# Patient Record
Sex: FEMALE | Race: BLACK OR AFRICAN AMERICAN | Employment: FULL TIME | URBAN - METROPOLITAN AREA
[De-identification: names, ages, dates, MRNs, and addresses within clinical notes are randomized per-mention and may not be internally consistent; named-entity substitution may affect disease eponyms.]

---

## 2017-09-28 ENCOUNTER — OFFICE VISIT (OUTPATIENT)
Dept: INTERNAL MEDICINE CLINIC | Age: 32
End: 2017-09-28
Payer: MEDICARE

## 2017-09-28 ENCOUNTER — TELEPHONE (OUTPATIENT)
Dept: INTERNAL MEDICINE CLINIC | Age: 32
End: 2017-09-28

## 2017-09-28 VITALS
OXYGEN SATURATION: 98 % | HEIGHT: 67 IN | DIASTOLIC BLOOD PRESSURE: 70 MMHG | WEIGHT: 230 LBS | RESPIRATION RATE: 21 BRPM | HEART RATE: 85 BPM | SYSTOLIC BLOOD PRESSURE: 120 MMHG | BODY MASS INDEX: 36.1 KG/M2

## 2017-09-28 DIAGNOSIS — J31.0 CHRONIC RHINITIS: Primary | ICD-10-CM

## 2017-09-28 DIAGNOSIS — F31.9 BIPOLAR 1 DISORDER (HCC): ICD-10-CM

## 2017-09-28 DIAGNOSIS — L21.9 SEBORRHEIC DERMATITIS: ICD-10-CM

## 2017-09-28 DIAGNOSIS — E66.9 OBESITY (BMI 35.0-39.9 WITHOUT COMORBIDITY): ICD-10-CM

## 2017-09-28 DIAGNOSIS — T78.40XD ALLERGY, SUBSEQUENT ENCOUNTER: ICD-10-CM

## 2017-09-28 PROCEDURE — 99213 OFFICE O/P EST LOW 20 MIN: CPT | Performed by: FAMILY MEDICINE

## 2017-09-28 RX ORDER — CITALOPRAM 20 MG/1
20 TABLET ORAL DAILY
Qty: 30 TABLET | Refills: 1 | Status: SHIPPED | OUTPATIENT
Start: 2017-09-28 | End: 2018-01-03 | Stop reason: SDUPTHER

## 2017-09-28 RX ORDER — RISPERIDONE 0.25 MG/1
0.25 TABLET, FILM COATED ORAL DAILY
Qty: 30 TABLET | Refills: 1 | Status: SHIPPED | OUTPATIENT
Start: 2017-09-28 | End: 2018-01-03 | Stop reason: SDUPTHER

## 2017-09-28 RX ORDER — LORATADINE 10 MG/1
10 TABLET ORAL DAILY
Qty: 30 TABLET | Refills: 0 | Status: SHIPPED | OUTPATIENT
Start: 2017-09-28 | End: 2018-08-30 | Stop reason: ALTCHOICE

## 2017-09-28 ASSESSMENT — PATIENT HEALTH QUESTIONNAIRE - PHQ9
SUM OF ALL RESPONSES TO PHQ9 QUESTIONS 1 & 2: 0
1. LITTLE INTEREST OR PLEASURE IN DOING THINGS: 0
SUM OF ALL RESPONSES TO PHQ QUESTIONS 1-9: 0
2. FEELING DOWN, DEPRESSED OR HOPELESS: 0

## 2017-09-28 ASSESSMENT — ENCOUNTER SYMPTOMS
GASTROINTESTINAL NEGATIVE: 1
EYES NEGATIVE: 1
RESPIRATORY NEGATIVE: 1
ALLERGIC/IMMUNOLOGIC NEGATIVE: 1

## 2017-10-03 NOTE — TELEPHONE ENCOUNTER
Patient notified she will call back if she needs a referral to psychiatry.  Or any further assistance

## 2017-10-03 NOTE — TELEPHONE ENCOUNTER
Patient needs this letter for her DX of bi polar disorder to her [employer]? Are we treating patient for the Bi polar?

## 2017-10-03 NOTE — TELEPHONE ENCOUNTER
Per verbal orders of Dr Sj Villalobos not to provide the letter to patient since he did not diagnosis patient he wants patient to go establish with mental health for evaluation and the mental health can provide letter. Per patient dr Paula De La Cruz diagnosed patient so she is asking for Dr Layne Givens to provide letter.  Message given to jens

## 2017-10-03 NOTE — TELEPHONE ENCOUNTER
Dr. Perfecto Nugent has an excellent idea. I have not seen patient in 2 years.     She must get any certification she needs from psychiatry

## 2017-10-04 ENCOUNTER — OFFICE VISIT (OUTPATIENT)
Dept: DERMATOLOGY | Age: 32
End: 2017-10-04
Payer: MEDICARE

## 2017-10-04 VITALS
SYSTOLIC BLOOD PRESSURE: 115 MMHG | WEIGHT: 230 LBS | HEIGHT: 67 IN | BODY MASS INDEX: 36.1 KG/M2 | OXYGEN SATURATION: 99 % | HEART RATE: 73 BPM | DIASTOLIC BLOOD PRESSURE: 81 MMHG

## 2017-10-04 DIAGNOSIS — B36.0 TINEA VERSICOLOR: ICD-10-CM

## 2017-10-04 DIAGNOSIS — L21.9 SEBORRHEIC DERMATITIS: Primary | ICD-10-CM

## 2017-10-04 PROCEDURE — 99202 OFFICE O/P NEW SF 15 MIN: CPT | Performed by: DERMATOLOGY

## 2017-10-04 RX ORDER — METRONIDAZOLE 500 MG/1
500 TABLET ORAL 2 TIMES DAILY
COMMUNITY
End: 2018-02-01 | Stop reason: ALTCHOICE

## 2017-10-04 RX ORDER — FLUOCINOLONE ACETONIDE 0.1 MG/ML
SOLUTION TOPICAL
Qty: 60 ML | Refills: 2 | Status: SHIPPED | OUTPATIENT
Start: 2017-10-04 | End: 2018-02-01 | Stop reason: ALTCHOICE

## 2017-10-04 RX ORDER — KETOCONAZOLE 20 MG/G
CREAM TOPICAL
Qty: 30 G | Refills: 1 | Status: SHIPPED | OUTPATIENT
Start: 2017-10-04 | End: 2018-02-01 | Stop reason: SDUPTHER

## 2017-10-04 RX ORDER — MEDROXYPROGESTERONE ACETATE 10 MG/1
10 TABLET ORAL DAILY
COMMUNITY
End: 2018-08-30 | Stop reason: ALTCHOICE

## 2017-10-04 NOTE — MR AVS SNAPSHOT
your BMI, the greater your risk of heart disease, high blood pressure, type 2 diabetes, stroke, gallstones, arthritis, sleep apnea, and certain cancers. BMI is not perfect. It may overestimate body fat in athletes and people who are more muscular. Even a small weight loss (between 5 and 10 percent of your current weight) by decreasing your calorie intake and becoming more physically active will help lower your risk of developing or worsening diseases associated with obesity. Learn more at: [employer]          Instructions    1. Use TSal (over the counter) every other day. Apply to scalp and let sit for 5 minutes, rinse off  2. Apply fluocinonide solution to scalp daily. Drop on and massage into scalp  3. Apply ketoconazole behind ears twice a day  4.  Follow up in office in 3 months            Medications and Orders      Your Current Medications Are              medroxyPROGESTERone (PROVERA) 10 MG tablet Take 10 mg by mouth daily    metroNIDAZOLE (FLAGYL) 500 MG tablet Take 500 mg by mouth 2 times daily    risperiDONE (RISPERDAL) 0.25 MG tablet Take 1 tablet by mouth daily    loratadine (CLARITIN) 10 MG tablet Take 1 tablet by mouth daily    citalopram (CELEXA) 20 MG tablet Take 1 tablet by mouth daily    fluticasone (FLONASE) 50 MCG/ACT nasal spray 1 spray by Nasal route daily as needed    azelastine (ASTELIN) 0.1 % nasal spray 2 sprays by Nasal route 2 times daily Use in each nostril as directed    ibuprofen (ADVIL;MOTRIN) 800 MG tablet       Allergies           No Known Allergies         Additional Information        Basic Information     Date Of Birth Sex Race Ethnicity Preferred Language    1985 Female Black Non-/Non  English      Problem List as of 10/4/2017  Date Reviewed: 10/4/2017                Seborrheic dermatitis    Chronic rhinitis    Bipolar 1 disorder (HCC)    Depression    Pes planus of right foot Your Goals as of 10/4/2017 at 3:07 PM                 Weight    Weight (lb) < 200     Notes    Patient Self-Management Goal for Health Maintenance  Goal: I will exercise for 30 minutes 3-5 days per week. Barriers: none  Plan for overcoming my barriers: N/A  Confidence: 6/10  Anticipated Goal Completion Date: 01/2018        Immunizations as of 10/4/2017     Name Date    Influenza Vaccine, unspecified formulation 9/28/2016    Influenza Virus Vaccine 10/21/2015    PPD Test 6/20/2016, 6/8/2016    Tdap (Boostrix, Adacel) 10/21/2015      Preventive Care        Date Due    Yearly Flu Vaccine (1) 10/16/2018 (Originally 9/1/2017)    HIV screening is recommended for all people regardless of risk factors  aged 15-65 years at least once (lifetime) who have never been HIV tested. 10/16/2018 (Originally 2/26/1999)    Pap Smear 10/17/2018 (Originally 10/21/2016)    Tetanus Combination Vaccine (2 - Td) 10/21/2025            [email or web address] Signup           Our records indicate that you have an active [employer] account. You can view your After Visit Summary by going to [email or web address] and logging in with your [employer] username and password. If you don't have a [employer] username and password but a parent or guardian has access to your record, the parent or guardian should login with their own [employer] username and password and access your record to view the After Visit Summary. Additional Information  If you have questions, please contact the physician practice where you receive care. Remember, [employer] is NOT to be used for urgent needs. For medical emergencies, dial 911. For questions regarding your [employer] account call [phone]. If you have a clinical question, please call your doctor's office.

## 2017-10-17 ENCOUNTER — TELEPHONE (OUTPATIENT)
Dept: INTERNAL MEDICINE CLINIC | Age: 32
End: 2017-10-17

## 2017-10-24 RX ORDER — NITROFURANTOIN 25; 75 MG/1; MG/1
100 CAPSULE ORAL 2 TIMES DAILY
Qty: 10 CAPSULE | Refills: 0 | Status: SHIPPED | OUTPATIENT
Start: 2017-10-24 | End: 2017-10-29

## 2017-10-24 NOTE — TELEPHONE ENCOUNTER
Patient states she had a drug screen for work today and showed positive for uti  Starting to have symptoms and asking if she can get meds

## 2018-01-03 ENCOUNTER — TELEPHONE (OUTPATIENT)
Dept: DERMATOLOGY | Age: 33
End: 2018-01-03

## 2018-01-03 RX ORDER — CITALOPRAM 20 MG/1
20 TABLET ORAL DAILY
Qty: 30 TABLET | Refills: 3 | Status: SHIPPED | OUTPATIENT
Start: 2018-01-03 | End: 2018-02-01 | Stop reason: ALTCHOICE

## 2018-01-03 RX ORDER — RISPERIDONE 0.25 MG/1
0.25 TABLET, FILM COATED ORAL DAILY
Qty: 30 TABLET | Refills: 3 | Status: SHIPPED | OUTPATIENT
Start: 2018-01-03 | End: 2018-02-01 | Stop reason: ALTCHOICE

## 2018-02-01 ENCOUNTER — OFFICE VISIT (OUTPATIENT)
Dept: DERMATOLOGY | Age: 33
End: 2018-02-01
Payer: MEDICARE

## 2018-02-01 VITALS
HEART RATE: 61 BPM | HEIGHT: 67 IN | WEIGHT: 219.6 LBS | BODY MASS INDEX: 34.47 KG/M2 | SYSTOLIC BLOOD PRESSURE: 123 MMHG | OXYGEN SATURATION: 100 % | DIASTOLIC BLOOD PRESSURE: 85 MMHG

## 2018-02-01 DIAGNOSIS — L21.9 SEBORRHEIC DERMATITIS: Primary | ICD-10-CM

## 2018-02-01 DIAGNOSIS — B36.0 TINEA VERSICOLOR: ICD-10-CM

## 2018-02-01 PROCEDURE — G8484 FLU IMMUNIZE NO ADMIN: HCPCS | Performed by: DERMATOLOGY

## 2018-02-01 PROCEDURE — 1036F TOBACCO NON-USER: CPT | Performed by: DERMATOLOGY

## 2018-02-01 PROCEDURE — G8427 DOCREV CUR MEDS BY ELIG CLIN: HCPCS | Performed by: DERMATOLOGY

## 2018-02-01 PROCEDURE — G8417 CALC BMI ABV UP PARAM F/U: HCPCS | Performed by: DERMATOLOGY

## 2018-02-01 PROCEDURE — 99213 OFFICE O/P EST LOW 20 MIN: CPT | Performed by: DERMATOLOGY

## 2018-02-01 RX ORDER — KETOCONAZOLE 20 MG/G
CREAM TOPICAL
Qty: 30 G | Refills: 1 | Status: SHIPPED | OUTPATIENT
Start: 2018-02-01 | End: 2018-08-30 | Stop reason: ALTCHOICE

## 2018-02-01 RX ORDER — FLUOCINONIDE TOPICAL SOLUTION USP, 0.05% 0.5 MG/ML
SOLUTION TOPICAL
Qty: 60 ML | Refills: 2 | Status: SHIPPED | OUTPATIENT
Start: 2018-02-01 | End: 2018-08-30 | Stop reason: ALTCHOICE

## 2018-08-30 ENCOUNTER — OFFICE VISIT (OUTPATIENT)
Dept: INTERNAL MEDICINE CLINIC | Age: 33
End: 2018-08-30
Payer: MEDICARE

## 2018-08-30 VITALS
HEART RATE: 86 BPM | RESPIRATION RATE: 20 BRPM | BODY MASS INDEX: 34.69 KG/M2 | WEIGHT: 221 LBS | SYSTOLIC BLOOD PRESSURE: 124 MMHG | HEIGHT: 67 IN | DIASTOLIC BLOOD PRESSURE: 86 MMHG

## 2018-08-30 DIAGNOSIS — E28.2 PCO (POLYCYSTIC OVARIES): Primary | ICD-10-CM

## 2018-08-30 DIAGNOSIS — M21.41 PES PLANUS OF RIGHT FOOT: ICD-10-CM

## 2018-08-30 DIAGNOSIS — M54.50 CHRONIC MIDLINE LOW BACK PAIN WITHOUT SCIATICA: ICD-10-CM

## 2018-08-30 DIAGNOSIS — M25.561 CHRONIC PAIN OF BOTH KNEES: ICD-10-CM

## 2018-08-30 DIAGNOSIS — J31.0 CHRONIC RHINITIS: ICD-10-CM

## 2018-08-30 DIAGNOSIS — N92.6 MENSTRUAL ABNORMALITY: ICD-10-CM

## 2018-08-30 DIAGNOSIS — G89.29 CHRONIC MIDLINE LOW BACK PAIN WITHOUT SCIATICA: ICD-10-CM

## 2018-08-30 DIAGNOSIS — F31.9 BIPOLAR 1 DISORDER (HCC): ICD-10-CM

## 2018-08-30 DIAGNOSIS — Z83.3 FAMILY HISTORY OF DIABETES MELLITUS: ICD-10-CM

## 2018-08-30 DIAGNOSIS — M25.562 CHRONIC PAIN OF BOTH KNEES: ICD-10-CM

## 2018-08-30 DIAGNOSIS — G89.29 CHRONIC PAIN OF BOTH KNEES: ICD-10-CM

## 2018-08-30 PROCEDURE — 99214 OFFICE O/P EST MOD 30 MIN: CPT | Performed by: FAMILY MEDICINE

## 2018-08-30 PROCEDURE — G8417 CALC BMI ABV UP PARAM F/U: HCPCS | Performed by: FAMILY MEDICINE

## 2018-08-30 PROCEDURE — 1036F TOBACCO NON-USER: CPT | Performed by: FAMILY MEDICINE

## 2018-08-30 PROCEDURE — G8427 DOCREV CUR MEDS BY ELIG CLIN: HCPCS | Performed by: FAMILY MEDICINE

## 2018-08-30 ASSESSMENT — ENCOUNTER SYMPTOMS
RESPIRATORY NEGATIVE: 1
GASTROINTESTINAL NEGATIVE: 1
BACK PAIN: 1
EYES NEGATIVE: 1
ALLERGIC/IMMUNOLOGIC NEGATIVE: 1

## 2018-08-30 NOTE — PROGRESS NOTES
Subjective:      Patient ID: Katherine Tuttle is a 28 y.o. female. Back Pain   This is a chronic problem. The current episode started more than 1 month ago. The problem occurs intermittently. The problem has been waxing and waning since onset. The pain is present in the lumbar spine. The quality of the pain is described as aching and cramping. The pain does not radiate. The pain is moderate. The pain is the same all the time. The symptoms are aggravated by stress and position. Risk factors include lack of exercise, poor posture and obesity. She has tried nothing for the symptoms. The treatment provided mild relief. Review of Systems   Constitutional: Negative. HENT: Negative. Eyes: Negative. Respiratory: Negative. Cardiovascular: Negative. Gastrointestinal: Negative. Endocrine: Negative. Musculoskeletal: Positive for arthralgias and back pain. Skin: Negative. Allergic/Immunologic: Negative. Neurological: Negative. Hematological: Negative. Psychiatric/Behavioral: The patient is nervous/anxious and is hyperactive. Past family and social history unremarkable. Objective:   Physical Exam   Constitutional: She is oriented to person, place, and time. She appears well-developed and well-nourished. Obesity   HENT:   Head: Normocephalic and atraumatic. Right Ear: External ear normal.   Left Ear: External ear normal.   Mouth/Throat: Oropharynx is clear and moist.   Eyes: Pupils are equal, round, and reactive to light. Conjunctivae and EOM are normal.   Neck: Normal range of motion. Neck supple. Cardiovascular: Normal rate, regular rhythm, normal heart sounds and intact distal pulses. Pulmonary/Chest: Effort normal and breath sounds normal.   Abdominal: Soft. Bowel sounds are normal.   Genitourinary: Vagina normal and uterus normal.   Musculoskeletal: Normal range of motion. Muscular skeletal chronic back pain. Neurologically intact.   Nares no apparent sign of myelopathy  Chronic complain of bilateral anterior knee pain more on the left than the right. Positive patellofemoral grind on the left. No instability symptoms. Neurological: She is alert and oriented to person, place, and time. She has normal reflexes. Skin: Skin is warm and dry. Psychiatric:   Known history of bipolar disorder. Patient continues to be hyperactive with low focus. However she has no desire to reconnect with mental health service. She denies suicidality   Vitals reviewed. Assessment:       Diagnosis Orders   1. PCO (polycystic ovaries)  CBC    Comprehensive Metabolic Panel    Hemoglobin A1C    Lipid Panel    TSH without Reflex   2. Menstrual abnormality     3. Bipolar 1 disorder ([facility] [address]     4. Pes planus of right foot  AliYuri barrios DO, Orthoped[facility] [address]   5. Chronic rhinitis     6. Chronic pain of both knees  Yuri Bennett DO, Orthoped[facility] [address]   7. Chronic midline low back pain without sciatica  Bernardo Honeycutt DO, [facility]    CBC    Comprehensive Metabolic Panel    Hemoglobin A1C    Lipid Panel    TSH without Reflex   8. Family history of diabetes mellitus  CBC    Comprehensive Metabolic Panel    Hemoglobin A1C    Lipid Panel    TSH without Reflex           Plan:      18-year-old overweight -American female is presented with subjective complaint of lumbar back up. Clinical examination is benign. She has rather sedentary lifestyle, poor posturing and no exercising regimen. Risk factor stratification is advised with sparing use of over-the-counter pain meds. Subjective complain of bilateral knee pain more on the left than the right. Possible chondromalacia patella. Patient will be evaluated by orthopedic service. Patient is demonstrated quadricep strengthening program.  I also suggest physical therapy  Morbid obesity.   She would benefit from low-fat high-fiber diet, daily moderate exercise, lifestyle change and weight loss to keep BMI 27. Brad Michelle Positive family history of diabetes mellitus. Risk factor stratification is advised. bilateral flexible pes planus   Patient recently diagnosed with PCO causing menstrual abnormality by her GYN and placed on metformin  Known history of bipolar disorder. Patient continues to be hyperactive. Patient stated that she continuously wants to be on the move from one activity to another activity. She does not express any desire to reconnect with mental health services. She is advised to reconsider  She denies tobacco, excessive alcohol or illicit drug use  Further recommendations to follow labs and evaluation by orthopedic service  Observe and call for any concern  This note is created with a voice recognition program and while intend to generate a document that accurately reflects the content of the visit, no guarantee can be provided that every mistake has been identified and corrected by editing.           Isa Mendez MD

## 2018-08-30 NOTE — PROGRESS NOTES
Visit Information    Have you changed or started any medications since your last visit including any over-the-counter medicines, vitamins, or herbal medicines? no   Are you having any side effects from any of your medications? -  no  Have you stopped taking any of your medications? Is so, why? -  no    Have you seen any other physician or provider since your last visit? Yes - Records Requested  Have you had any other diagnostic tests since your last visit? No  Have you been seen in the emergency room and/or had an admission to a hospital since we last saw you? Yes - Records Obtained  Have you had your routine dental cleaning in the past 6 months? yes     Have you activated your [employer] account? If not, what are your barriers?  Yes     Patient Care Team:  Roney Maher MD as PCP - General (Family Medicine)  Roney Maher MD as PCP - [facility] Attributed Provider    Medical History Review  Past Medical, Family, and Social History reviewed and does not contribute to the patient presenting condition    Health Maintenance   Topic Date Due    A1C test (Diabetic or Prediabetic)  10/21/2016    Flu vaccine (1) 10/16/2018 (Originally 9/1/2018)    HIV screen  10/16/2018 (Originally 2/26/1999)    Cervical cancer screen  10/17/2018 (Originally 10/21/2016)    DTaP/Tdap/Td vaccine (2 - Td) 10/21/2025

## 2018-12-18 ENCOUNTER — OFFICE VISIT (OUTPATIENT)
Dept: DERMATOLOGY | Age: 33
End: 2018-12-18
Payer: MEDICARE

## 2018-12-18 VITALS
SYSTOLIC BLOOD PRESSURE: 122 MMHG | HEIGHT: 67 IN | DIASTOLIC BLOOD PRESSURE: 85 MMHG | BODY MASS INDEX: 35.47 KG/M2 | HEART RATE: 68 BPM | WEIGHT: 226 LBS | OXYGEN SATURATION: 98 % | TEMPERATURE: 98.3 F

## 2018-12-18 DIAGNOSIS — L21.9 SEBORRHEIC DERMATITIS: Primary | ICD-10-CM

## 2018-12-18 PROCEDURE — G8484 FLU IMMUNIZE NO ADMIN: HCPCS | Performed by: DERMATOLOGY

## 2018-12-18 PROCEDURE — 1036F TOBACCO NON-USER: CPT | Performed by: DERMATOLOGY

## 2018-12-18 PROCEDURE — G8427 DOCREV CUR MEDS BY ELIG CLIN: HCPCS | Performed by: DERMATOLOGY

## 2018-12-18 PROCEDURE — 99213 OFFICE O/P EST LOW 20 MIN: CPT | Performed by: DERMATOLOGY

## 2018-12-18 PROCEDURE — G8417 CALC BMI ABV UP PARAM F/U: HCPCS | Performed by: DERMATOLOGY

## 2018-12-18 RX ORDER — FLUOCINOLONE ACETONIDE 0.11 MG/ML
OIL TOPICAL
Qty: 120 ML | Refills: 6 | Status: SHIPPED | OUTPATIENT
Start: 2018-12-18 | End: 2020-07-28

## 2018-12-18 NOTE — PROGRESS NOTES
Dermatology Patient Note  [facility]  [address]  [address] [place] [address]  Dept: [phone]  Dept Fax: [phone]      VISIT DATE: 12/18/2018   REFERRING PROVIDER: No ref. provider found      Kimberlee Ridley is a 28 y.o. female  who presents today in the office for:    Follow-up (Seborrheic Dermatitis)      HISTORY OF PRESENT ILLNESS:  HPI Rash Followup:    Desiree Soliman was seen today for follow-up evaluation of [facility]    Interim Course: Worsening    Areas of Involvement: Scalp    Associated Symptoms: Itching    Exacerbating Factors: none    Current Medications for this Rash:  T/Sal, lidex     Rash Treatment Compliance:  T/Sal helps, lidex not tolerated    Side Effects from Treatments: Irritation    Interim  Evaluation: None                CURRENT MEDICATIONS:   Current Outpatient Prescriptions   Medication Sig Dispense Refill    fluocinolone (DERMA-SMOOTHE) 0.01 % external oil Apply topically daily to scalp 120 mL 6     No current facility-administered medications for this visit. ALLERGIES:   No Known Allergies    SOCIAL HISTORY:  Social History   Substance Use Topics    Smoking status: Never Smoker    Smokeless tobacco: Never Used    Alcohol use No       REVIEW OF SYSTEMS:  Review of Systems   Constitutional: Negative. Skin:Denies any new changing, growing or bleeding lesions or rashes except as described in the HPI     PHYSICAL EXAM:   /85   Pulse 68   Temp 98.3 °F (36.8 °C) (Oral)   Ht 5' 7\" (1.702 m)   Wt 226 lb (102.5 kg)   SpO2 98%   BMI 35.40 kg/m²     General Exam:  General Appearance: No acute distress, Well nourished     Neuro: Alert and oriented to person, place and time  Psych: Normal affect   Lymph Node: Not performed    Cutaneous Exam: Performed as documented in clinic note below. Sun-exposed skin,which includes the head/face, neck, both arms, digits and/or nails was examined.     Pertinent Physical Exam Findings:  Physical

## 2018-12-28 ENCOUNTER — OFFICE VISIT (OUTPATIENT)
Dept: INTERNAL MEDICINE CLINIC | Age: 33
End: 2018-12-28
Payer: MEDICARE

## 2018-12-28 ENCOUNTER — HOSPITAL ENCOUNTER (OUTPATIENT)
Age: 33
Setting detail: SPECIMEN
Discharge: HOME | End: 2018-12-28
Payer: MEDICARE

## 2018-12-28 VITALS
SYSTOLIC BLOOD PRESSURE: 124 MMHG | OXYGEN SATURATION: 97 % | WEIGHT: 227 LBS | DIASTOLIC BLOOD PRESSURE: 80 MMHG | RESPIRATION RATE: 18 BRPM | BODY MASS INDEX: 35.63 KG/M2 | HEART RATE: 67 BPM | HEIGHT: 67 IN

## 2018-12-28 DIAGNOSIS — E28.2 PCO (POLYCYSTIC OVARIES): ICD-10-CM

## 2018-12-28 DIAGNOSIS — E66.9 OBESITY (BMI 30-39.9): ICD-10-CM

## 2018-12-28 DIAGNOSIS — F31.9 BIPOLAR 1 DISORDER (HCC): ICD-10-CM

## 2018-12-28 DIAGNOSIS — L21.9 SEBORRHEIC DERMATITIS: ICD-10-CM

## 2018-12-28 DIAGNOSIS — R73.01 IFG (IMPAIRED FASTING GLUCOSE): ICD-10-CM

## 2018-12-28 DIAGNOSIS — R73.01 IFG (IMPAIRED FASTING GLUCOSE): Primary | ICD-10-CM

## 2018-12-28 DIAGNOSIS — M54.9 MUSCULOSKELETAL BACK PAIN: ICD-10-CM

## 2018-12-28 DIAGNOSIS — J31.0 CHRONIC RHINITIS: ICD-10-CM

## 2018-12-28 LAB
ALBUMIN SERPL-MCNC: 4.1 G/DL (ref 3.5–5.2)
ALBUMIN/GLOBULIN RATIO: 1.1 (ref 1–2.5)
ALP BLD-CCNC: 73 U/L (ref 35–104)
ALT SERPL-CCNC: 25 U/L (ref 5–33)
ANION GAP SERPL CALCULATED.3IONS-SCNC: 12 MMOL/L (ref 9–17)
AST SERPL-CCNC: 30 U/L
BILIRUB SERPL-MCNC: 0.3 MG/DL (ref 0.3–1.2)
BUN BLDV-MCNC: 10 MG/DL (ref 6–20)
BUN/CREAT BLD: ABNORMAL (ref 9–20)
CALCIUM SERPL-MCNC: 9.5 MG/DL (ref 8.6–10.4)
CHLORIDE BLD-SCNC: 102 MMOL/L (ref 98–107)
CHOLESTEROL/HDL RATIO: 4.9
CHOLESTEROL: 200 MG/DL
CO2: 24 MMOL/L (ref 20–31)
CREAT SERPL-MCNC: 0.94 MG/DL (ref 0.5–0.9)
CREATININE URINE: 348.8 MG/DL (ref 28–217)
ESTIMATED AVERAGE GLUCOSE: 105 MG/DL
GFR AFRICAN AMERICAN: >60 ML/MIN
GFR NON-AFRICAN AMERICAN: >60 ML/MIN
GFR SERPL CREATININE-BSD FRML MDRD: ABNORMAL ML/MIN/{1.73_M2}
GFR SERPL CREATININE-BSD FRML MDRD: ABNORMAL ML/MIN/{1.73_M2}
GLUCOSE BLD-MCNC: 86 MG/DL (ref 70–99)
HBA1C MFR BLD: 5.3 % (ref 4–6)
HCT VFR BLD CALC: 41.2 % (ref 36.3–47.1)
HDLC SERPL-MCNC: 41 MG/DL
HEMOGLOBIN: 13.3 G/DL (ref 11.9–15.1)
LDL CHOLESTEROL: 109 MG/DL (ref 0–130)
MCH RBC QN AUTO: 29.4 PG (ref 25.2–33.5)
MCHC RBC AUTO-ENTMCNC: 32.3 G/DL (ref 28.4–34.8)
MCV RBC AUTO: 90.9 FL (ref 82.6–102.9)
MICROALBUMIN/CREAT 24H UR: <12 MG/L
MICROALBUMIN/CREAT UR-RTO: ABNORMAL MCG/MG CREAT
NRBC AUTOMATED: 0 PER 100 WBC
PDW BLD-RTO: 13.3 % (ref 11.8–14.4)
PLATELET # BLD: 262 K/UL (ref 138–453)
PMV BLD AUTO: 12.3 FL (ref 8.1–13.5)
POTASSIUM SERPL-SCNC: 4 MMOL/L (ref 3.7–5.3)
RBC # BLD: 4.53 M/UL (ref 3.95–5.11)
SODIUM BLD-SCNC: 138 MMOL/L (ref 135–144)
TOTAL PROTEIN: 7.8 G/DL (ref 6.4–8.3)
TRIGL SERPL-MCNC: 250 MG/DL
TSH SERPL DL<=0.05 MIU/L-ACNC: 5.84 MIU/L (ref 0.3–5)
VITAMIN D 25-HYDROXY: 11.7 NG/ML (ref 30–100)
VLDLC SERPL CALC-MCNC: ABNORMAL MG/DL (ref 1–30)
WBC # BLD: 6.1 K/UL (ref 3.5–11.3)

## 2018-12-28 PROCEDURE — 1036F TOBACCO NON-USER: CPT | Performed by: FAMILY MEDICINE

## 2018-12-28 PROCEDURE — G8482 FLU IMMUNIZE ORDER/ADMIN: HCPCS | Performed by: FAMILY MEDICINE

## 2018-12-28 PROCEDURE — 90688 IIV4 VACCINE SPLT 0.5 ML IM: CPT | Performed by: FAMILY MEDICINE

## 2018-12-28 PROCEDURE — 90471 IMMUNIZATION ADMIN: CPT | Performed by: FAMILY MEDICINE

## 2018-12-28 PROCEDURE — G8417 CALC BMI ABV UP PARAM F/U: HCPCS | Performed by: FAMILY MEDICINE

## 2018-12-28 PROCEDURE — 99214 OFFICE O/P EST MOD 30 MIN: CPT | Performed by: FAMILY MEDICINE

## 2018-12-28 PROCEDURE — G8427 DOCREV CUR MEDS BY ELIG CLIN: HCPCS | Performed by: FAMILY MEDICINE

## 2018-12-28 ASSESSMENT — ENCOUNTER SYMPTOMS
GASTROINTESTINAL NEGATIVE: 1
EYES NEGATIVE: 1
ALLERGIC/IMMUNOLOGIC NEGATIVE: 1
BACK PAIN: 1
RESPIRATORY NEGATIVE: 1

## 2018-12-28 NOTE — PROGRESS NOTES
Please see immunization tab.               Health Maintenance   Topic Date Due    HIV screen  02/26/1999    A1C test (Diabetic or Prediabetic)  10/21/2016    Cervical cancer screen  10/21/2016    Flu vaccine (1) 09/01/2018    DTaP/Tdap/Td vaccine (2 - Td) 10/21/2025

## 2018-12-31 ENCOUNTER — TELEPHONE (OUTPATIENT)
Dept: INTERNAL MEDICINE CLINIC | Age: 33
End: 2018-12-31

## 2018-12-31 DIAGNOSIS — R79.89 LOW VITAMIN D LEVEL: Primary | ICD-10-CM

## 2018-12-31 RX ORDER — ERGOCALCIFEROL (VITAMIN D2) 10 MCG
1 TABLET ORAL 2 TIMES DAILY
Qty: 60 TABLET | Refills: 0 | Status: SHIPPED | OUTPATIENT
Start: 2018-12-31 | End: 2018-12-31 | Stop reason: SDUPTHER

## 2018-12-31 RX ORDER — ERGOCALCIFEROL (VITAMIN D2) 10 MCG
1 TABLET ORAL 2 TIMES DAILY
Qty: 180 TABLET | Refills: 0 | Status: SHIPPED | OUTPATIENT
Start: 2018-12-31 | End: 2019-03-20 | Stop reason: SDUPTHER

## 2019-03-20 ENCOUNTER — OFFICE VISIT (OUTPATIENT)
Dept: INTERNAL MEDICINE CLINIC | Age: 34
End: 2019-03-20
Payer: MEDICARE

## 2019-03-20 VITALS
DIASTOLIC BLOOD PRESSURE: 80 MMHG | OXYGEN SATURATION: 99 % | HEIGHT: 67 IN | HEART RATE: 72 BPM | BODY MASS INDEX: 36.26 KG/M2 | WEIGHT: 231 LBS | SYSTOLIC BLOOD PRESSURE: 120 MMHG | RESPIRATION RATE: 18 BRPM

## 2019-03-20 DIAGNOSIS — R79.89 LOW VITAMIN D LEVEL: ICD-10-CM

## 2019-03-20 DIAGNOSIS — F31.9 BIPOLAR 1 DISORDER (HCC): ICD-10-CM

## 2019-03-20 DIAGNOSIS — G47.33 OSA (OBSTRUCTIVE SLEEP APNEA): ICD-10-CM

## 2019-03-20 DIAGNOSIS — E28.2 PCO (POLYCYSTIC OVARIES): Primary | ICD-10-CM

## 2019-03-20 DIAGNOSIS — N91.2 AMENORRHEA: ICD-10-CM

## 2019-03-20 DIAGNOSIS — R73.01 IFG (IMPAIRED FASTING GLUCOSE): ICD-10-CM

## 2019-03-20 DIAGNOSIS — E66.9 OBESITY (BMI 30.0-34.9): ICD-10-CM

## 2019-03-20 DIAGNOSIS — L21.9 SEBORRHEIC DERMATITIS: ICD-10-CM

## 2019-03-20 DIAGNOSIS — J31.0 CHRONIC RHINITIS: ICD-10-CM

## 2019-03-20 PROBLEM — E66.811 OBESITY (BMI 30.0-34.9): Status: ACTIVE | Noted: 2019-03-20

## 2019-03-20 PROCEDURE — G8427 DOCREV CUR MEDS BY ELIG CLIN: HCPCS | Performed by: FAMILY MEDICINE

## 2019-03-20 PROCEDURE — 99214 OFFICE O/P EST MOD 30 MIN: CPT | Performed by: FAMILY MEDICINE

## 2019-03-20 PROCEDURE — G8482 FLU IMMUNIZE ORDER/ADMIN: HCPCS | Performed by: FAMILY MEDICINE

## 2019-03-20 PROCEDURE — G8417 CALC BMI ABV UP PARAM F/U: HCPCS | Performed by: FAMILY MEDICINE

## 2019-03-20 PROCEDURE — 1036F TOBACCO NON-USER: CPT | Performed by: FAMILY MEDICINE

## 2019-03-20 RX ORDER — QUETIAPINE FUMARATE 25 MG/1
25 TABLET, FILM COATED ORAL 2 TIMES DAILY
Qty: 60 TABLET | Refills: 3 | Status: SHIPPED | OUTPATIENT
Start: 2019-03-20 | End: 2020-07-28

## 2019-03-20 RX ORDER — ERGOCALCIFEROL (VITAMIN D2) 10 MCG
1 TABLET ORAL 2 TIMES DAILY
Qty: 180 TABLET | Refills: 2 | Status: SHIPPED | OUTPATIENT
Start: 2019-03-20 | End: 2020-07-28

## 2019-03-20 ASSESSMENT — ENCOUNTER SYMPTOMS
BACK PAIN: 1
ALLERGIC/IMMUNOLOGIC NEGATIVE: 1
GASTROINTESTINAL NEGATIVE: 1
RESPIRATORY NEGATIVE: 1
EYES NEGATIVE: 1

## 2019-03-21 DIAGNOSIS — M25.561 PAIN IN BOTH KNEES, UNSPECIFIED CHRONICITY: Primary | ICD-10-CM

## 2019-03-21 DIAGNOSIS — M25.562 PAIN IN BOTH KNEES, UNSPECIFIED CHRONICITY: Primary | ICD-10-CM

## 2019-03-25 ENCOUNTER — OFFICE VISIT (OUTPATIENT)
Dept: ORTHOPEDIC SURGERY | Age: 34
End: 2019-03-25
Payer: MEDICARE

## 2019-03-25 VITALS — WEIGHT: 231.04 LBS | BODY MASS INDEX: 36.26 KG/M2 | HEIGHT: 67 IN

## 2019-03-25 DIAGNOSIS — M25.562 ACUTE PAIN OF LEFT KNEE: Primary | ICD-10-CM

## 2019-03-25 PROCEDURE — 1036F TOBACCO NON-USER: CPT | Performed by: STUDENT IN AN ORGANIZED HEALTH CARE EDUCATION/TRAINING PROGRAM

## 2019-03-25 PROCEDURE — 99203 OFFICE O/P NEW LOW 30 MIN: CPT | Performed by: STUDENT IN AN ORGANIZED HEALTH CARE EDUCATION/TRAINING PROGRAM

## 2019-03-25 PROCEDURE — G8482 FLU IMMUNIZE ORDER/ADMIN: HCPCS | Performed by: STUDENT IN AN ORGANIZED HEALTH CARE EDUCATION/TRAINING PROGRAM

## 2019-03-25 PROCEDURE — G8417 CALC BMI ABV UP PARAM F/U: HCPCS | Performed by: STUDENT IN AN ORGANIZED HEALTH CARE EDUCATION/TRAINING PROGRAM

## 2019-03-25 PROCEDURE — G8427 DOCREV CUR MEDS BY ELIG CLIN: HCPCS | Performed by: STUDENT IN AN ORGANIZED HEALTH CARE EDUCATION/TRAINING PROGRAM

## 2019-04-01 ENCOUNTER — TELEPHONE (OUTPATIENT)
Dept: OBGYN CLINIC | Age: 34
End: 2019-04-01

## 2019-04-01 NOTE — TELEPHONE ENCOUNTER
patient is scheduled in office on 04/08/2019, please request previous medical records from Dr Rio Booekr.   Patient is also going to try and request

## 2019-04-01 NOTE — TELEPHONE ENCOUNTER
Spoke with pt and let her know we can't request records without a signed consent form she is going to request them and have them faxed

## 2019-04-08 NOTE — PROGRESS NOTES
I performed a history and physical examination of the patient and discussed management with the resident. I reviewed the residents note and agree with the documented findings and plan of care. Any areas of disagreement are noted on the chart. I have personally evaluated this patient and have completed at least one if not all key elements of the E/M (history, physical exam, and MDM). Additional findings are as noted. I agree with the chief complaint, past medical history, past surgical history, allergies, medications, social and family history as documented unless otherwise noted below.      Electronically signed by Kamini Dangelo DO on 4/8/2019 at 11:01 AM

## 2019-04-15 ENCOUNTER — OFFICE VISIT (OUTPATIENT)
Dept: OBGYN CLINIC | Age: 34
End: 2019-04-15
Payer: MEDICARE

## 2019-04-15 ENCOUNTER — HOSPITAL ENCOUNTER (OUTPATIENT)
Age: 34
Setting detail: SPECIMEN
Discharge: HOME | End: 2019-04-15
Payer: MEDICARE

## 2019-04-15 VITALS
BODY MASS INDEX: 36.41 KG/M2 | WEIGHT: 232 LBS | HEART RATE: 87 BPM | SYSTOLIC BLOOD PRESSURE: 130 MMHG | HEIGHT: 67 IN | DIASTOLIC BLOOD PRESSURE: 80 MMHG

## 2019-04-15 DIAGNOSIS — N89.8 VAGINAL DISCHARGE: ICD-10-CM

## 2019-04-15 DIAGNOSIS — Z01.419 WELL WOMAN EXAM WITH ROUTINE GYNECOLOGICAL EXAM: Primary | ICD-10-CM

## 2019-04-15 PROCEDURE — 99395 PREV VISIT EST AGE 18-39: CPT | Performed by: OBSTETRICS & GYNECOLOGY

## 2019-04-15 RX ORDER — DROSPIRENONE AND ETHINYL ESTRADIOL 0.03MG-3MG
1 KIT ORAL DAILY
Qty: 1 PACKET | Refills: 3 | Status: SHIPPED | OUTPATIENT
Start: 2019-04-15 | End: 2020-07-28

## 2019-04-15 NOTE — PROGRESS NOTES
[facility] [place] [address]  4/15/2019                         Primary Care Physician: Nathaniel Severino MD    CC:   Chief Complaint   Patient presents with    Annual Exam         HPI: [facility] [place] O Juan Diop is a 27 y.o. female  No LMP recorded. (Menstrual status: Other - See Notes). The patient was seen and examined. She is here for an annual visit. She is complaining of vaginal itching and odor. Reports h/o recurrent BV last treated about 2 months ago. Denies douching. No recent change in soaps or detergents. No new partner in over 14 years. States that flagyl usually helps. LMP about 1.5 years ago. Reports h/o PCOS for which she is taking metformin. OCPs regulated her cycles initially but then it was recalled. She tried a variety of other generics with horrible side effects (weight gain, hair loss, etc.) Tried depo and had increased weight gain as well. H/O clomid induction with second daughter 10 years ago. She states that she and her  were \"sort of trying\" for the past two years but she is tired. She would rather just restart OCPs or some other medication to help her resume regular periods. Her bowel habits are regular. She denies any bloating. She denies dysuria. She denies urinary leaking. She denies vaginal discharge. She is sexually active with her .       Depression Screen: Symptoms of decreased mood absent  Symptoms of anhedonia absent  **If either question is answered in a  positive fashion then complete the PHQ9 Scoring Evaluation and make the appropriate referral**    REVIEW OF SYSTEMS:   Constitutional: negative fever, negative chills  HEENT: negative visual disturbances, negative headaches  Respiratory: negative dyspnea, negative cough  Cardiovascular: negative chest pain,  negative palpitations  Gastrointestinal: negative abdominal pain, negative RUQ pain, negative N/V, negative diarrhea, negative constipation  Genitourinary: positive irregular cycles, negative diclofenac (VOLTAREN) 50 MG EC tablet Take 1 tablet by mouth 2 times daily 3/25/19   Candido Guillen DO   QUEtiapine (SEROQUEL) 25 MG tablet Take 1 tablet by mouth 2 times daily 3/20/19   Lucy Raygoza MD   fluocinolone (DERMA-SMOOTHE) 0.01 % external oil Apply topically daily to scalp 12/18/18   Lindsey Albright MD       FAMILY HISTORY:  Family History of Breast, Ovarian, Colon or Uterine Cancer: No   family history is not on file. SOCIAL HISTORY:   reports that she has never smoked. She has never used smokeless tobacco. She reports that she does not drink alcohol or use drugs. HEALTH MAINTENANCE:  Immunization status: stated as up to date, no records available     Date of Last Mammogram: n/a  Date of Last Colonoscopy: n/a  Date of Last Bone Density: n/a    VITALS:  Vitals:    04/15/19 1500 04/15/19 1505   BP: (!) 151/101 130/80   Site: Right Upper Arm    Position: Sitting    Cuff Size: Large Adult    Pulse: 87    Weight: 232 lb (105.2 kg)    Height: 5' 7\" (1.702 m)                                                                                                                                                                        PHYSICAL EXAM:   General Appearance: Appears healthy. Alert; in no acute distress. Pleasant. Skin: Skin color, texture, turgor normal. No rashes or lesions. HEENT: normocephalic and atraumatic   Respiratory: Normal expansion. Clear to auscultation. No rales, rhonchi, or wheezing.   Cardiovascular: normal rate and normal S1 and S2  Breast:  (Chest): normal appearance, no masses or tenderness, No nipple retraction or dimpling, No nipple discharge or bleeding, No axillary or supraclavicular adenopathy, Normal to palpation without dominant masses  Abdomen: obese, soft, non-tender, non-distended, no right upper quadrant tenderness and no CVA tenderness   Pelvic Exam:   External genitalia: normal hair distribution, no lesions or erythema  Urinary system: urethral meatus normal, Cancer screening done. Tobacco & Secondary smoke risks reviewed; with recommendation for cessation and avoidance. Routine health maintenance per patients PCP    Diagnosis Orders   1. Well woman exam with routine gynecological exam  drospirenone-ethinyl estradiol (DYLAN 28) 3-0.03 MG TABS    PAP SMEAR   2.  Vaginal discharge  Vaginitis DNA Probe          See-DO [facility]  4/15/2019 4:18 PM

## 2019-04-16 ENCOUNTER — TELEPHONE (OUTPATIENT)
Dept: OBGYN CLINIC | Age: 34
End: 2019-04-16

## 2019-04-16 PROBLEM — B96.89 BV (BACTERIAL VAGINOSIS): Status: ACTIVE | Noted: 2019-04-16

## 2019-04-16 PROBLEM — N76.0 BV (BACTERIAL VAGINOSIS): Status: ACTIVE | Noted: 2019-04-16

## 2019-04-16 LAB
DIRECT EXAM: ABNORMAL
HPV SAMPLE: NORMAL
HPV, GENOTYPE 16: NOT DETECTED
HPV, GENOTYPE 18: NOT DETECTED
HPV, HIGH RISK OTHER: NOT DETECTED
HPV, INTERPRETATION: NORMAL
Lab: ABNORMAL
SPECIMEN DESCRIPTION: ABNORMAL
SPECIMEN DESCRIPTION: NORMAL

## 2019-04-16 RX ORDER — METRONIDAZOLE 500 MG/1
500 TABLET ORAL 2 TIMES DAILY
Qty: 14 TABLET | Refills: 0 | Status: SHIPPED | OUTPATIENT
Start: 2019-04-16 | End: 2019-04-23

## 2019-04-16 RX ORDER — FLUCONAZOLE 150 MG/1
150 TABLET ORAL
Qty: 6 TABLET | Refills: 0 | Status: SHIPPED | OUTPATIENT
Start: 2019-05-03 | End: 2019-10-01

## 2019-04-16 RX ORDER — METRONIDAZOLE 500 MG/1
2000 TABLET ORAL
Qty: 24 TABLET | Refills: 0 | Status: SHIPPED | OUTPATIENT
Start: 2019-05-01 | End: 2019-09-29

## 2019-04-16 NOTE — TELEPHONE ENCOUNTER
SPOKE WITH [facility] AND LET HER KNOW THAT PRESCRIPTIONS WERE SENT INTO THE PHARMACY

## 2019-04-23 LAB — CYTOLOGY REPORT: NORMAL

## 2019-06-05 PROBLEM — E28.2 POLYCYSTIC OVARIAN SYNDROME: Status: ACTIVE | Noted: 2017-10-26

## 2019-10-25 ENCOUNTER — HOSPITAL ENCOUNTER (OUTPATIENT)
Age: 34
Discharge: HOME | End: 2019-10-25

## 2019-10-25 LAB — RUBV IGG SER QL: 161.6 IU/ML

## 2019-10-25 PROCEDURE — 86481 TB AG RESPONSE T-CELL SUSP: CPT

## 2019-10-25 PROCEDURE — 86765 RUBEOLA ANTIBODY: CPT

## 2019-10-25 PROCEDURE — 86735 MUMPS ANTIBODY: CPT

## 2019-10-25 PROCEDURE — 86787 VARICELLA-ZOSTER ANTIBODY: CPT

## 2019-10-25 PROCEDURE — 86762 RUBELLA ANTIBODY: CPT

## 2019-10-28 LAB
MEASLES IMMUNE (IGG): 1.54
MUV IGG SER QL: 2.86
T-SPOT TB TEST: NORMAL
VZV IGG SER QL IA: 1.31

## 2020-07-28 ENCOUNTER — HOSPITAL ENCOUNTER (OUTPATIENT)
Age: 35
Setting detail: SPECIMEN
Discharge: HOME | End: 2020-07-28
Payer: COMMERCIAL

## 2020-07-28 ENCOUNTER — OFFICE VISIT (OUTPATIENT)
Dept: OBGYN CLINIC | Age: 35
End: 2020-07-28
Payer: COMMERCIAL

## 2020-07-28 ENCOUNTER — OFFICE VISIT (OUTPATIENT)
Dept: INTERNAL MEDICINE CLINIC | Age: 35
End: 2020-07-28
Payer: COMMERCIAL

## 2020-07-28 VITALS
DIASTOLIC BLOOD PRESSURE: 78 MMHG | HEIGHT: 67 IN | RESPIRATION RATE: 16 BRPM | TEMPERATURE: 97.4 F | BODY MASS INDEX: 36.73 KG/M2 | WEIGHT: 234 LBS | SYSTOLIC BLOOD PRESSURE: 120 MMHG | HEART RATE: 72 BPM | OXYGEN SATURATION: 98 %

## 2020-07-28 VITALS
HEART RATE: 70 BPM | WEIGHT: 234.2 LBS | DIASTOLIC BLOOD PRESSURE: 102 MMHG | BODY MASS INDEX: 36.76 KG/M2 | HEIGHT: 67 IN | SYSTOLIC BLOOD PRESSURE: 145 MMHG

## 2020-07-28 PROBLEM — B96.89 BV (BACTERIAL VAGINOSIS): Status: RESOLVED | Noted: 2019-04-16 | Resolved: 2020-07-28

## 2020-07-28 PROBLEM — N76.0 BV (BACTERIAL VAGINOSIS): Status: RESOLVED | Noted: 2019-04-16 | Resolved: 2020-07-28

## 2020-07-28 PROBLEM — L21.9 SEBORRHEIC DERMATITIS OF SCALP: Status: ACTIVE | Noted: 2020-07-28

## 2020-07-28 LAB
ALBUMIN SERPL-MCNC: 4.1 G/DL (ref 3.5–5.2)
ALBUMIN/GLOBULIN RATIO: 1.3 (ref 1–2.5)
ALP BLD-CCNC: 70 U/L (ref 35–104)
ALT SERPL-CCNC: 15 U/L (ref 5–33)
ANION GAP SERPL CALCULATED.3IONS-SCNC: 12 MMOL/L (ref 9–17)
AST SERPL-CCNC: 23 U/L
BILIRUB SERPL-MCNC: 0.31 MG/DL (ref 0.3–1.2)
BUN BLDV-MCNC: 11 MG/DL (ref 6–20)
BUN/CREAT BLD: ABNORMAL (ref 9–20)
CALCIUM SERPL-MCNC: 9.1 MG/DL (ref 8.6–10.4)
CHLORIDE BLD-SCNC: 104 MMOL/L (ref 98–107)
CHOLESTEROL/HDL RATIO: 4.7
CHOLESTEROL: 201 MG/DL
CO2: 22 MMOL/L (ref 20–31)
CREAT SERPL-MCNC: 1.03 MG/DL (ref 0.5–0.9)
DIRECT EXAM: ABNORMAL
ESTIMATED AVERAGE GLUCOSE: 117 MG/DL
GFR AFRICAN AMERICAN: >60 ML/MIN
GFR NON-AFRICAN AMERICAN: >60 ML/MIN
GFR SERPL CREATININE-BSD FRML MDRD: ABNORMAL ML/MIN/{1.73_M2}
GFR SERPL CREATININE-BSD FRML MDRD: ABNORMAL ML/MIN/{1.73_M2}
GLUCOSE BLD-MCNC: 78 MG/DL (ref 70–99)
HAV IGM SER IA-ACNC: NONREACTIVE
HBA1C MFR BLD: 5.7 % (ref 4–6)
HCT VFR BLD CALC: 42.7 % (ref 36.3–47.1)
HDLC SERPL-MCNC: 43 MG/DL
HEMOGLOBIN: 13.5 G/DL (ref 11.9–15.1)
HEPATITIS B CORE IGM ANTIBODY: NONREACTIVE
HEPATITIS B SURFACE ANTIGEN: NONREACTIVE
HEPATITIS C ANTIBODY: NONREACTIVE
LDL CHOLESTEROL: 123 MG/DL (ref 0–130)
Lab: ABNORMAL
MCH RBC QN AUTO: 29.5 PG (ref 25.2–33.5)
MCHC RBC AUTO-ENTMCNC: 31.6 G/DL (ref 28.4–34.8)
MCV RBC AUTO: 93.2 FL (ref 82.6–102.9)
NRBC AUTOMATED: 0 PER 100 WBC
PDW BLD-RTO: 13.8 % (ref 11.8–14.4)
PLATELET # BLD: 264 K/UL (ref 138–453)
PMV BLD AUTO: 12.7 FL (ref 8.1–13.5)
POTASSIUM SERPL-SCNC: 4.3 MMOL/L (ref 3.7–5.3)
RBC # BLD: 4.58 M/UL (ref 3.95–5.11)
SODIUM BLD-SCNC: 138 MMOL/L (ref 135–144)
SPECIMEN DESCRIPTION: ABNORMAL
T. PALLIDUM, IGG: NONREACTIVE
TOTAL PROTEIN: 7.3 G/DL (ref 6.4–8.3)
TRIGL SERPL-MCNC: 176 MG/DL
TSH SERPL DL<=0.05 MIU/L-ACNC: 2.07 MIU/L (ref 0.3–5)
VLDLC SERPL CALC-MCNC: ABNORMAL MG/DL (ref 1–30)
WBC # BLD: 5.2 K/UL (ref 3.5–11.3)

## 2020-07-28 PROCEDURE — 99395 PREV VISIT EST AGE 18-39: CPT | Performed by: OBSTETRICS & GYNECOLOGY

## 2020-07-28 PROCEDURE — 99214 OFFICE O/P EST MOD 30 MIN: CPT | Performed by: FAMILY MEDICINE

## 2020-07-28 RX ORDER — QUETIAPINE FUMARATE 25 MG/1
25 TABLET, FILM COATED ORAL NIGHTLY
Qty: 60 TABLET | Refills: 1 | Status: SHIPPED | OUTPATIENT
Start: 2020-07-28 | End: 2020-10-28 | Stop reason: DRUGHIGH

## 2020-07-28 RX ORDER — DROSPIRENONE AND ETHINYL ESTRADIOL 0.03MG-3MG
1 KIT ORAL DAILY
Qty: 1 PACKET | Refills: 11 | Status: SHIPPED | OUTPATIENT
Start: 2020-07-28 | End: 2021-07-30

## 2020-07-28 RX ORDER — SPIRONOLACTONE 25 MG/1
25 TABLET ORAL DAILY
Qty: 90 TABLET | Refills: 1 | Status: SHIPPED | OUTPATIENT
Start: 2020-07-28 | End: 2021-12-16 | Stop reason: SDUPTHER

## 2020-07-28 RX ORDER — KETOCONAZOLE 20 MG/ML
SHAMPOO TOPICAL
Qty: 2 BOTTLE | Refills: 3 | Status: SHIPPED | OUTPATIENT
Start: 2020-07-28

## 2020-07-28 ASSESSMENT — ENCOUNTER SYMPTOMS
RESPIRATORY NEGATIVE: 1
ALLERGIC/IMMUNOLOGIC NEGATIVE: 1
EYES NEGATIVE: 1
GASTROINTESTINAL NEGATIVE: 1

## 2020-07-28 NOTE — PROGRESS NOTES
[facility] [place] [address]  2020                         Primary Care Physician: Monster Hdz MD    CC:   Chief Complaint   Patient presents with    Annual Exam     2019 N         HPI: [facility] [place] [address] [place] is a 38 y.o. female  No LMP recorded. (Menstrual status: Other - See Notes). The patient was seen and examined. She is here for an annual visit. She reports that she only took her OCPs for about a month last year until she lost her job and her insurance. Since then, she has not had a period. She just saw her PCP today and is going to restart metformin and aldactone. Her bowel habits are regular. She denies any bloating. She denies dysuria. She denies urinary leaking. She reports vaginal discharge similar to prior BV infections. She is sexually active with a male partner.     Depression Screen: Symptoms of decreased mood absent  Symptoms of anhedonia absent  **If either question is answered in a  positive fashion then complete the PHQ9 Scoring Evaluation and make the appropriate referral**    REVIEW OF SYSTEMS:  Constitutional: negative fever, negative chills  HEENT: negative visual disturbances, negative headaches  Respiratory: negative dyspnea, negative cough  Cardiovascular: negative chest pain,  negative palpitations  Gastrointestinal: negative abdominal pain, negative RUQ pain, negative N/V, negative diarrhea, negative constipation  Genitourinary: negative dysuria, negative vaginal discharge  Dermatological: negative rash  Hematologic: negative bruising  Immunologic/Lymphatic: negative recent illness, negative recent sick contact  Musculoskeletal: negative back pain, negative myalgias, negative arthralgias  Neurological:  negative dizziness, negative weakness  Behavior/Psych: negative depression, negative anxiety      GYNECOLOGICAL HISTORY:  Age of Menarche: 12  Age of Menopause: n/a      Sexually Active: has sex with males  STD History: no past history     Pap History: Last PAP was NILM neg HRHPV, 4/15/19  Colposcopy History: denies     Permanent Sterilization: no  Reversible Birth Control: no  Hormone Replacement Exposure: no      OBSTETRICAL HISTORY:  OB History    Para Term  AB Living   2 2 2 0 0 2   SAB TAB Ectopic Molar Multiple Live Births   0 0 0 0 0 2      # Outcome Date GA Lbr Aric/2nd Weight Sex Delivery Anes PTL Lv   2 Term      Vag-Spont   HERNANDEZ   1 Term      Vag-Spont   HERNANDEZ       PAST MEDICAL HISTORY:   has a past medical history of Amenorrhea, Back pain, Chronic rhinitis, Depression, Headache(784.0), IFG (impaired fasting glucose), Low vitamin D level, Obesity (BMI 30.0-34.9), PCO (polycystic ovaries), Seborrheic dermatitis, and Sleep apnea. PAST SURGICAL HISTORY:   has a past surgical history that includes Foot surgery (Right); [place] tooth extraction; and Foot surgery (Right, 14). ALLERGIES:  is allergic to bactrim [sulfamethoxazole-trimethoprim]. MEDICATIONS:  Prior to Admission medications    Medication Sig Start Date End Date Taking? Authorizing Provider   metFORMIN (GLUCOPHAGE) 500 MG tablet take 1 tablet by mouth once daily WITH BREAKFAST 20  Yes Erick Tellez MD   spironolactone (ALDACTONE) 25 MG tablet Take 1 tablet by mouth daily 20  Yes Erick Tellez MD   ketoconazole (NIZORAL) 2 % shampoo Apply topically daily as needed. 20  Yes Erick Tellez MD   QUEtiapine (SEROQUEL) 25 MG tablet Take 1 tablet by mouth nightly  Patient not taking: Reported on 2020   Erick Tellez MD       FAMILY HISTORY:  Family History of Breast, Ovarian, Colon or Uterine Cancer: No   family history is not on file. SOCIAL HISTORY:   reports that she has never smoked. She has never used smokeless tobacco. She reports that she does not drink alcohol or use drugs.     HEALTH MAINTENANCE:  Immunization status: stated as up to date, no records available    Date of Last Mammogram: n/a  Date of Last Colonoscopy: n/a  Date of Last Bone Density: n/a    VITALS:  Vitals:    07/28/20 1124   BP: (!) 145/102   Site: Left Upper Arm   Position: Sitting   Cuff Size: Large Adult   Pulse: 70   Weight: 234 lb 3.2 oz (106.2 kg)   Height: 5' 7\" (1.702 m)                                                                                                                                                                         PHYSICAL EXAM:   General Appearance: Appears healthy. Alert; in no acute distress. Pleasant. Skin: Skin color, texture, turgor normal. No rashes or lesions. HEENT: normocephalic and atraumatic   Respiratory: Normal expansion. Clear to auscultation. No rales, rhonchi, or wheezing. Cardiovascular: normal rate and normal S1 and S2  Breast:  (Chest): normal appearance, no masses or tenderness, No nipple retraction or dimpling, No nipple discharge or bleeding, No axillary or supraclavicular adenopathy, Normal to palpation without dominant masses  Abdomen: obese, soft, non-tender, non-distended, no right upper quadrant tenderness and no CVA tenderness   Pelvic Exam:   External genitalia: normal hair distribution, no lesions or erythema  Urinary system: urethral meatus normal, no bladder tenderness  Vaginal: normal mucosa, no lesions or discharge noted  Cervix: normal appearing cervix without discharge or lesions, no CMT  Adnexa: limited by body habitus, not palpable, nontender and no masses  Uterus: limited by body habitus, about 8wk size, anteverted, nontender, no masses  Musculoskeletal: no gross abnormalities  Extremities: non-tender BLE and non-edematous  Psych:  oriented to time, place and person, mood and affect are within normal limits     ASSESSMENT & PLAN:    Marybel Aguero is a 38 y.o. female [id number] No LMP recorded. (Menstrual status: Other - See Notes). - Reviewed that amenorrhea likely due to PCOS, but UPT added to urine from PCP's visit.  Reviewed implications of PCOS including increased risk for insulin resistance/DM, lipid abnormalities and cardiovascular disease, as well as GYN specific effects (possible infertility, oligomenorrhea, increased risk of endometrial cancer). Reviewed medical management options for endometrial protection, including side effect profiles and dosing regimens. All questions answered. Patient desires to proceed with restarting her OCPs at this time. Rx given. - Encouraged weight loss with diet and exercise.   - GC/c and vaginitis swabs collected and sent. Reviewed appropriate genital hygiene. Discussed treatment options for recurrent BV should she be positive today: 1) metrogel pv one full applicator twice weekly for 6 months, 2) boric acid 600mg capsules pv qhs x21 days followed by metrogel pv one full applicator twice weekly for 6 months, or 3) monthly flagyl 2g po with diflucan 150mg po every month for 6 months. Patient desires monthly flagyl and diflucan. - Reviewed ASCCP guidelines. Repeat cotesting due 4/2024.   - Encouraged follow up with PCP due to elevated BP today. No s/s hypertensive urgency/emergency. Patient Active Problem List    Diagnosis Date Noted    Seborrheic dermatitis of scalp 07/28/2020    IFG (impaired fasting glucose) 03/20/2019    Obesity (BMI 30.0-34.9) 03/20/2019    PCO (polycystic ovaries) 08/30/2018    Polycystic ovarian syndrome 10/26/2017    Seborrheic dermatitis 10/21/2015    Chronic rhinitis 10/21/2015    Bipolar 1 disorder ([facility] [address] 09/29/2014    Depression 09/29/2014    Pes planus of right foot 06/26/2014       Return in about 1 year (around 7/28/2021) for annual or earlier prn. No Patient Care Coordination Note on file. Counseling Completed:    Discussed need for repeat pap as per American Society for Colposcopy and Cervical Pathology guidelines. Discussed need for mammograms every 1 year, If >42 yo and last mammogram was negative. Discussed Calcium and Vitamin D dosing.   Discussed need for colonoscopy screening as well as onset for bone density testing. Birth control and barrier recommendations reviewed. Discussed STD counseling and prevention. Hereditary Breast, Ovarian, Colon and Uterine Cancer screening done. Tobacco & Secondary smoke risks reviewed; with recommendation for cessation and avoidance. Routine health maintenance per patients PCP     Diagnosis Orders   1. Well woman exam with routine gynecological exam     2. Vaginal discharge  VAGINITIS DNA PROBE    Chlamydia Trachomatis & Neisseria gonorrhoeae (GC) by amplified detection   3. PCOS (polycystic ovarian syndrome)  drospirenone-ethinyl estradiol (DYLAN 28) 3-0.03 MG TABS    Pregnancy, Urine   4.  Missed menses  Pregnancy, Urine          Cleve Isabel So, DO   [facility] [place]  7/28/2020 11:29 AM

## 2020-07-28 NOTE — PROGRESS NOTES
HIV Rapid 1&2    T. pallidum Ab    Hepatitis Panel, Acute    CBC    Comprehensive Metabolic Panel    Hemoglobin A1C    Lipid Panel    TSH without Reflex         Objective:   Physical Exam  Vitals signs and nursing note reviewed. Constitutional:       Appearance: She is well-developed. Comments: Obesity   HENT:      Head: Normocephalic and atraumatic. Right Ear: External ear normal.      Left Ear: External ear normal.      Nose:      Comments: Allergic rhinitis  Eyes:      Conjunctiva/sclera: Conjunctivae normal.      Pupils: Pupils are equal, round, and reactive to light. Neck:      Musculoskeletal: Normal range of motion and neck supple. Cardiovascular:      Rate and Rhythm: Normal rate and regular rhythm. Heart sounds: Normal heart sounds. Pulmonary:      Effort: Pulmonary effort is normal.      Breath sounds: Normal breath sounds. Abdominal:      General: Bowel sounds are normal.      Palpations: Abdomen is soft. Genitourinary:     Vagina: Normal.      Comments: Polycystic ovarian syndrome  Musculoskeletal: Normal range of motion. Skin:     General: Skin is warm and dry. Comments: Seborrheic dermatitis scalp   Neurological:      Mental Status: She is alert and oriented to person, place, and time. Deep Tendon Reflexes: Reflexes are normal and symmetric. Psychiatric:      Comments: Chronic mental health condition. She denied suicidality, delusion or hallucination         Assessment:       Diagnosis Orders   1. Seborrheic dermatitis of scalp     2. Polycystic ovarian syndrome  Urine Drug Screen    HIV Rapid 1&2    T. pallidum Ab    Hepatitis Panel, Acute    CBC    Comprehensive Metabolic Panel    Hemoglobin A1C    Lipid Panel    TSH without Reflex   3. IFG (impaired fasting glucose)  Urine Drug Screen    HIV Rapid 1&2    T. pallidum Ab    Hepatitis Panel, Acute    CBC    Comprehensive Metabolic Panel    Hemoglobin A1C    Lipid Panel    TSH without Reflex   4.  Pes planus of right foot     5. Bipolar 1 disorder (HCC)  Urine Drug Screen    HIV Rapid 1&2    T. pallidum Ab    Hepatitis Panel, Acute    CBC    Comprehensive Metabolic Panel    Hemoglobin A1C    Lipid Panel    TSH without Reflex   6. Current mild episode of major depressive disorder without prior episode (HCC)     7. Seborrheic dermatitis     8. Chronic rhinitis     9. PCO (polycystic ovaries)  Urine Drug Screen    HIV Rapid 1&2    T. pallidum Ab    Hepatitis Panel, Acute    CBC    Comprehensive Metabolic Panel    Hemoglobin A1C    Lipid Panel    TSH without Reflex   10. Obesity (BMI 30.0-34.9)  Urine Drug Screen    HIV Rapid 1&2    T. pallidum Ab    Hepatitis Panel, Acute    CBC    Comprehensive Metabolic Panel    Hemoglobin A1C    Lipid Panel    TSH without Reflex           Plan:      50-year-old -American female is presented with signs and symptoms of seborrheic dermatitis scalp. I have ordered Nizoral shampoo  Known history of polycystic ovarian syndrome. She is established with GYN. She has 2 teenage children. She is planning to conceive in the future. She agrees to be placed back on metformin and Aldactone  Obesity with weight gain. She is advised low-fat high-fiber diet, daily moderate exercise, lifestyle change and weight reduction to keep BMI around 25. Metformin will be additional assistance  Chronic mental health condition. She used to be on Seroquel however she stopped taking it because of oversedation. She is advised to consider restarting night dose only. She agrees to proceed  Allergies allergic rhinitis. No recent flare. She may use over-the-counter nasal saline and antihistamine  She denies tobacco, excessive alcohol or illicit drug use  Further recommendations to follow labs  Musculoskeletal pain syndrome. May take over-the-counter Tylenol with sparing use of anti-inflammatory  Impaired fasting glucose. Risk factor stratification is advised.   Start metformin  This note is created with a

## 2020-07-29 LAB
C TRACH DNA GENITAL QL NAA+PROBE: NEGATIVE
N. GONORRHOEAE DNA: NEGATIVE
SPECIMEN DESCRIPTION: NORMAL

## 2020-07-29 RX ORDER — METRONIDAZOLE 500 MG/1
500 TABLET ORAL 2 TIMES DAILY
Qty: 14 TABLET | Refills: 0 | Status: SHIPPED | OUTPATIENT
Start: 2020-07-29 | End: 2020-08-05

## 2020-07-29 RX ORDER — FLUCONAZOLE 150 MG/1
150 TABLET ORAL
Qty: 1 TABLET | Refills: 0 | Status: SHIPPED | OUTPATIENT
Start: 2020-09-04 | End: 2021-02-02

## 2020-07-29 RX ORDER — METRONIDAZOLE 500 MG/1
2000 TABLET ORAL
Qty: 24 TABLET | Refills: 0 | Status: SHIPPED | OUTPATIENT
Start: 2020-09-01 | End: 2021-02-22

## 2020-10-28 ENCOUNTER — OFFICE VISIT (OUTPATIENT)
Dept: INTERNAL MEDICINE CLINIC | Age: 35
End: 2020-10-28
Payer: COMMERCIAL

## 2020-10-28 VITALS
BODY MASS INDEX: 33.74 KG/M2 | HEIGHT: 67 IN | WEIGHT: 215 LBS | HEART RATE: 71 BPM | SYSTOLIC BLOOD PRESSURE: 122 MMHG | RESPIRATION RATE: 24 BRPM | TEMPERATURE: 97.1 F | OXYGEN SATURATION: 98 % | DIASTOLIC BLOOD PRESSURE: 70 MMHG

## 2020-10-28 PROBLEM — H61.23 BILATERAL IMPACTED CERUMEN: Status: ACTIVE | Noted: 2020-10-28

## 2020-10-28 PROBLEM — E28.2 POLYCYSTIC OVARIAN SYNDROME: Status: RESOLVED | Noted: 2017-10-26 | Resolved: 2020-10-28

## 2020-10-28 PROBLEM — E78.2 MIXED HYPERLIPIDEMIA: Status: ACTIVE | Noted: 2020-10-28

## 2020-10-28 PROCEDURE — 90471 IMMUNIZATION ADMIN: CPT | Performed by: FAMILY MEDICINE

## 2020-10-28 PROCEDURE — 99214 OFFICE O/P EST MOD 30 MIN: CPT | Performed by: FAMILY MEDICINE

## 2020-10-28 PROCEDURE — 90688 IIV4 VACCINE SPLT 0.5 ML IM: CPT | Performed by: FAMILY MEDICINE

## 2020-10-28 RX ORDER — QUETIAPINE FUMARATE 25 MG/1
50 TABLET, FILM COATED ORAL 2 TIMES DAILY
Qty: 60 TABLET | Refills: 1 | Status: SHIPPED | COMMUNITY
Start: 2020-10-28 | End: 2021-09-02 | Stop reason: ALTCHOICE

## 2020-10-28 ASSESSMENT — ENCOUNTER SYMPTOMS
EYES NEGATIVE: 1
GASTROINTESTINAL NEGATIVE: 1
ALLERGIC/IMMUNOLOGIC NEGATIVE: 1
RESPIRATORY NEGATIVE: 1

## 2020-10-28 NOTE — PROGRESS NOTES
Subjective:      Patient ID: Konrad Chavez is a 38 y.o. female. Fatigue   This is a chronic problem. The current episode started more than 1 month ago. The problem occurs intermittently. The problem has been waxing and waning. Associated symptoms include arthralgias and fatigue. The symptoms are aggravated by stress. She has tried rest, sleep, walking and relaxation (Seroquel) for the symptoms. The treatment provided moderate relief. Review of Systems   Constitutional: Positive for fatigue. HENT: Negative. Eyes: Negative. Respiratory: Negative. Cardiovascular: Negative. Gastrointestinal: Negative. Endocrine: Negative. Musculoskeletal: Positive for arthralgias. Skin: Negative. Allergic/Immunologic: Negative. Neurological: Negative. Hematological: Negative. Psychiatric/Behavioral: Positive for dysphoric mood. The patient is nervous/anxious. Past family and social history unremarkable. Diagnosis Orders   1. IFG (impaired fasting glucose)     2. Bipolar 1 disorder (HCC)     3. Seborrheic dermatitis     4. Chronic rhinitis     5. PCO (polycystic ovaries)     6. Pes planus of right foot     7. Obesity (BMI 30.0-34.9)     8. Seborrheic dermatitis of scalp     9. Need for influenza vaccination  INFLUENZA, QUADV, 0.5ML, 6 MO AND OLDER, IM, MDV, (Harrison Valero)   10. Mixed hyperlipidemia     11. Bilateral impacted cerumen           Objective:   Physical Exam  Vitals signs and nursing note reviewed. Constitutional:       Appearance: She is well-developed. Comments: Obesity   HENT:      Head: Normocephalic and atraumatic. Right Ear: External ear normal.      Left Ear: External ear normal.   Eyes:      Conjunctiva/sclera: Conjunctivae normal.      Pupils: Pupils are equal, round, and reactive to light. Neck:      Musculoskeletal: Normal range of motion and neck supple. Cardiovascular:      Rate and Rhythm: Normal rate and regular rhythm.       Heart sounds: Normal heart sounds. Pulmonary:      Effort: Pulmonary effort is normal.      Breath sounds: Normal breath sounds. Abdominal:      General: Bowel sounds are normal.      Palpations: Abdomen is soft. Genitourinary:     Vagina: Normal.      Comments: Polycystic ovarian syndrome  Musculoskeletal: Normal range of motion. Skin:     General: Skin is warm and dry. Neurological:      Mental Status: She is alert and oriented to person, place, and time. Deep Tendon Reflexes: Reflexes are normal and symmetric. Psychiatric:      Comments: Anxiety/depression, insomnia on Seroquel         Assessment:       Diagnosis Orders   1. IFG (impaired fasting glucose)     2. Bipolar 1 disorder (HCC)     3. Seborrheic dermatitis     4. Chronic rhinitis     5. PCO (polycystic ovaries)     6. Pes planus of right foot     7. Obesity (BMI 30.0-34.9)     8. Seborrheic dermatitis of scalp     9. Need for influenza vaccination  INFLUENZA, QUADV, 0.5ML, 6 MO AND OLDER, IM, MDV, (Brittany Iha)   10. Mixed hyperlipidemia     11. Bilateral impacted cerumen             Plan:      71-year-old overweight -American female is presented with subjective complaint of fatigue. There are no localizing/lateralizing signs, afebrile hemodynamically stable, clinical examination appears benign. Reassurance provided. She will benefit from low-fat high-fiber diet, daily moderate exercise, lifestyle change and weight reduction to keep BMI around 25  Polycystic ovarian syndrome with insulin insensitivity. She is on Aldactone and metformin that she is tolerating well with positive response  Anxiety/depression. She is advised to increase Seroquel from 25  mg daily to 50 mg twice daily. She is updated on Pap and breast exam as provided by GYN  Dyslipidemia. She will benefit nonpharmacologically.   Risk factor stratification is advised  Further recommendations to follow labs  She denies tobacco, excessive alcohol or illicit drug use  Seborrheic dermatitis with improvement to over-the-counter Nizoral shampoo  Med list reviewed advised to continue with compliance  This note is created with a voice recognition program and while intend to generate a document that accurately reflects the content of the visit, no guarantee can be provided that every mistake has been identified and corrected by editing.           Maria Esther Luke MD

## 2020-12-24 ENCOUNTER — NURSE TRIAGE (OUTPATIENT)
Dept: OTHER | Facility: CLINIC | Age: 35
End: 2020-12-24

## 2020-12-24 ENCOUNTER — TELEPHONE (OUTPATIENT)
Dept: INTERNAL MEDICINE CLINIC | Age: 35
End: 2020-12-24

## 2020-12-24 NOTE — TELEPHONE ENCOUNTER
denies any other questions or concerns; instructed to call back for any new or worsening symptoms. Attention Provider: Thank you for allowing me to participate in the care of your patient. The patient was connected to triage in response to information provided to the [facility]. Please do not respond through this encounter as the response is not directed to a shared pool.

## 2021-02-20 DIAGNOSIS — N76.0 BV (BACTERIAL VAGINOSIS): ICD-10-CM

## 2021-02-20 DIAGNOSIS — B96.89 BV (BACTERIAL VAGINOSIS): ICD-10-CM

## 2021-02-22 RX ORDER — METRONIDAZOLE 500 MG/1
2000 TABLET ORAL
Qty: 24 TABLET | Refills: 0 | Status: SHIPPED | OUTPATIENT
Start: 2021-02-22 | End: 2021-07-23

## 2021-07-30 DIAGNOSIS — E28.2 PCOS (POLYCYSTIC OVARIAN SYNDROME): ICD-10-CM

## 2021-07-30 RX ORDER — DROSPIRENONE AND ETHINYL ESTRADIOL 0.03MG-3MG
KIT ORAL
Qty: 28 TABLET | Refills: 1 | Status: SHIPPED | OUTPATIENT
Start: 2021-07-30 | End: 2021-10-19 | Stop reason: SDUPTHER

## 2021-09-02 ENCOUNTER — HOSPITAL ENCOUNTER (OUTPATIENT)
Age: 36
Setting detail: SPECIMEN
Discharge: HOME | End: 2021-09-02
Payer: COMMERCIAL

## 2021-09-02 ENCOUNTER — OFFICE VISIT (OUTPATIENT)
Dept: INTERNAL MEDICINE CLINIC | Age: 36
End: 2021-09-02
Payer: COMMERCIAL

## 2021-09-02 VITALS
WEIGHT: 216.8 LBS | SYSTOLIC BLOOD PRESSURE: 120 MMHG | HEART RATE: 89 BPM | DIASTOLIC BLOOD PRESSURE: 88 MMHG | BODY MASS INDEX: 34.03 KG/M2 | TEMPERATURE: 97.7 F | HEIGHT: 67 IN | OXYGEN SATURATION: 99 %

## 2021-09-02 DIAGNOSIS — E78.2 MIXED HYPERLIPIDEMIA: ICD-10-CM

## 2021-09-02 DIAGNOSIS — R73.01 IFG (IMPAIRED FASTING GLUCOSE): ICD-10-CM

## 2021-09-02 DIAGNOSIS — J31.0 CHRONIC RHINITIS: ICD-10-CM

## 2021-09-02 DIAGNOSIS — F31.9 BIPOLAR 1 DISORDER (HCC): ICD-10-CM

## 2021-09-02 DIAGNOSIS — E28.2 PCO (POLYCYSTIC OVARIES): ICD-10-CM

## 2021-09-02 DIAGNOSIS — M21.41 PES PLANUS OF RIGHT FOOT: Primary | ICD-10-CM

## 2021-09-02 DIAGNOSIS — L21.9 SEBORRHEIC DERMATITIS: ICD-10-CM

## 2021-09-02 DIAGNOSIS — Z28.21 COVID-19 VACCINATION DECLINED: ICD-10-CM

## 2021-09-02 DIAGNOSIS — E66.9 OBESITY (BMI 30.0-34.9): ICD-10-CM

## 2021-09-02 PROBLEM — H61.23 BILATERAL IMPACTED CERUMEN: Status: RESOLVED | Noted: 2020-10-28 | Resolved: 2021-09-02

## 2021-09-02 LAB
ALBUMIN SERPL-MCNC: 4.2 G/DL (ref 3.5–5.2)
ALBUMIN/GLOBULIN RATIO: 1.3 (ref 1–2.5)
ALP BLD-CCNC: 64 U/L (ref 35–104)
ALT SERPL-CCNC: 18 U/L (ref 5–33)
ANION GAP SERPL CALCULATED.3IONS-SCNC: 16 MMOL/L (ref 9–17)
AST SERPL-CCNC: 28 U/L
BILIRUB SERPL-MCNC: 0.27 MG/DL (ref 0.3–1.2)
BUN BLDV-MCNC: 8 MG/DL (ref 6–20)
BUN/CREAT BLD: ABNORMAL (ref 9–20)
CALCIUM SERPL-MCNC: 9.4 MG/DL (ref 8.6–10.4)
CHLORIDE BLD-SCNC: 108 MMOL/L (ref 98–107)
CHOLESTEROL/HDL RATIO: 2.3
CHOLESTEROL: 188 MG/DL
CO2: 18 MMOL/L (ref 20–31)
CREAT SERPL-MCNC: 0.94 MG/DL (ref 0.5–0.9)
GFR AFRICAN AMERICAN: >60 ML/MIN
GFR NON-AFRICAN AMERICAN: >60 ML/MIN
GFR SERPL CREATININE-BSD FRML MDRD: ABNORMAL ML/MIN/{1.73_M2}
GFR SERPL CREATININE-BSD FRML MDRD: ABNORMAL ML/MIN/{1.73_M2}
GLUCOSE BLD-MCNC: 85 MG/DL (ref 70–99)
HCT VFR BLD CALC: 37.7 % (ref 36.3–47.1)
HDLC SERPL-MCNC: 82 MG/DL
HEMOGLOBIN: 12 G/DL (ref 11.9–15.1)
LDL CHOLESTEROL: 75 MG/DL (ref 0–130)
MCH RBC QN AUTO: 28.5 PG (ref 25.2–33.5)
MCHC RBC AUTO-ENTMCNC: 31.8 G/DL (ref 28.4–34.8)
MCV RBC AUTO: 89.5 FL (ref 82.6–102.9)
NRBC AUTOMATED: 0 PER 100 WBC
PDW BLD-RTO: 13.6 % (ref 11.8–14.4)
PLATELET # BLD: 342 K/UL (ref 138–453)
PMV BLD AUTO: 11.9 FL (ref 8.1–13.5)
POTASSIUM SERPL-SCNC: 3.9 MMOL/L (ref 3.7–5.3)
RBC # BLD: 4.21 M/UL (ref 3.95–5.11)
SODIUM BLD-SCNC: 142 MMOL/L (ref 135–144)
TOTAL PROTEIN: 7.4 G/DL (ref 6.4–8.3)
TRIGL SERPL-MCNC: 155 MG/DL
TSH SERPL DL<=0.05 MIU/L-ACNC: 2.46 MIU/L (ref 0.3–5)
VLDLC SERPL CALC-MCNC: ABNORMAL MG/DL (ref 1–30)
WBC # BLD: 5.9 K/UL (ref 3.5–11.3)

## 2021-09-02 PROCEDURE — 99214 OFFICE O/P EST MOD 30 MIN: CPT | Performed by: FAMILY MEDICINE

## 2021-09-02 RX ORDER — BUPROPION HYDROCHLORIDE 150 MG/1
150 TABLET ORAL EVERY MORNING
Qty: 30 TABLET | Refills: 3 | Status: SHIPPED | OUTPATIENT
Start: 2021-09-02

## 2021-09-02 SDOH — ECONOMIC STABILITY: FOOD INSECURITY: WITHIN THE PAST 12 MONTHS, YOU WORRIED THAT YOUR FOOD WOULD RUN OUT BEFORE YOU GOT MONEY TO BUY MORE.: NEVER TRUE

## 2021-09-02 SDOH — ECONOMIC STABILITY: FOOD INSECURITY: WITHIN THE PAST 12 MONTHS, THE FOOD YOU BOUGHT JUST DIDN'T LAST AND YOU DIDN'T HAVE MONEY TO GET MORE.: NEVER TRUE

## 2021-09-02 ASSESSMENT — ENCOUNTER SYMPTOMS
EYES NEGATIVE: 1
ALLERGIC/IMMUNOLOGIC NEGATIVE: 1
GASTROINTESTINAL NEGATIVE: 1
RESPIRATORY NEGATIVE: 1

## 2021-09-02 ASSESSMENT — PATIENT HEALTH QUESTIONNAIRE - PHQ9
SUM OF ALL RESPONSES TO PHQ QUESTIONS 1-9: 0
1. LITTLE INTEREST OR PLEASURE IN DOING THINGS: 0
SUM OF ALL RESPONSES TO PHQ QUESTIONS 1-9: 0
SUM OF ALL RESPONSES TO PHQ9 QUESTIONS 1 & 2: 0
SUM OF ALL RESPONSES TO PHQ QUESTIONS 1-9: 0
2. FEELING DOWN, DEPRESSED OR HOPELESS: 0

## 2021-09-02 ASSESSMENT — SOCIAL DETERMINANTS OF HEALTH (SDOH): HOW HARD IS IT FOR YOU TO PAY FOR THE VERY BASICS LIKE FOOD, HOUSING, MEDICAL CARE, AND HEATING?: NOT HARD AT ALL

## 2021-09-02 NOTE — PROGRESS NOTES
Subjective:      Patient ID: Magdalena Saldana is a 39 y.o. female. Rash  The current episode started more than 1 month ago. The problem has been gradually improving since onset. The affected locations include the scalp. The rash is characterized by scaling and itchiness. Treatments tried: Nizoral shampoo. The treatment provided moderate relief. Review of Systems   Constitutional: Negative. HENT: Negative. Eyes: Negative. Respiratory: Negative. Cardiovascular: Negative. Gastrointestinal: Negative. Endocrine: Negative. Musculoskeletal: Positive for arthralgias. Skin: Positive for rash. Allergic/Immunologic: Negative. Neurological: Negative. Hematological: Negative. Psychiatric/Behavioral: Positive for dysphoric mood. The patient is nervous/anxious. Past family and social history unremarkable. Diagnosis Orders   1. Pes planus of right foot     2. Bipolar 1 disorder (HCC)  CBC    Comprehensive Metabolic Panel    Hemoglobin A1C    Lipid Panel    TSH without Reflex   3. Seborrheic dermatitis  CBC    Comprehensive Metabolic Panel    Hemoglobin A1C    Lipid Panel    TSH without Reflex   4. Chronic rhinitis  CBC    Comprehensive Metabolic Panel    Hemoglobin A1C    Lipid Panel    TSH without Reflex   5. PCO (polycystic ovaries)  CBC    Comprehensive Metabolic Panel    Hemoglobin A1C    Lipid Panel    TSH without Reflex   6. IFG (impaired fasting glucose)  CBC    Comprehensive Metabolic Panel    Hemoglobin A1C    Lipid Panel    TSH without Reflex   7. Obesity (BMI 30.0-34.9)     8. Mixed hyperlipidemia  CBC    Comprehensive Metabolic Panel    Hemoglobin A1C    Lipid Panel    TSH without Reflex   9. COVID-19 vaccination declined           Objective:   Physical Exam  Vitals and nursing note reviewed. Constitutional:       Appearance: She is well-developed. Comments: Increased BMI   HENT:      Head: Normocephalic and atraumatic.       Right Ear: External ear normal. afebrile hemodynamically stable, clinical examination is benign. Reassurance provided. Underlying history of anxiety/depression. Patient states that she was doing well with Seroquel however she does not like sedative side effect. She wished to be switched. I placed her on Wellbutrin. I strongly suggest that she would benefit from psych consultation  Subjective complaint of itching and dryness in both ears. Otoscopy is unremarkable. Patient states that she has been daily using hydrogen peroxide drops. Stop daily hydrogen peroxide drops and avoid excessive Q-tips  Seborrheic dermatitis. Continue Nizoral shampoo  Obesity with PCOS. She is advised to continue Aldactone, Metformin. She would benefit from low-fat high-fiber diet, daily moderate exercise, lifestyle change and weight reduction to keep BMI around 25  She declined COVID-19 vaccination  She is on OCP by GYN. She denies tobacco, excessive alcohol or illicit drug use  Dyslipidemia. Nonpharmacological treatment is advised  Med list reviewed advised to continue  Further recommendations to follow labs  Call for any concern    This note is created with a voice recognition program and while intend to generate a document that accurately reflects the content of the visit, no guarantee can be provided that every mistake has been identified and corrected by editing.        Bee Curran MD

## 2021-09-03 LAB
ESTIMATED AVERAGE GLUCOSE: 114 MG/DL
HBA1C MFR BLD: 5.6 % (ref 4–6)

## 2021-10-19 ENCOUNTER — TELEPHONE (OUTPATIENT)
Dept: OBGYN CLINIC | Age: 36
End: 2021-10-19

## 2021-10-19 DIAGNOSIS — E28.2 PCOS (POLYCYSTIC OVARIAN SYNDROME): ICD-10-CM

## 2021-10-19 RX ORDER — DROSPIRENONE AND ETHINYL ESTRADIOL 0.03MG-3MG
KIT ORAL
Qty: 28 TABLET | Refills: 1 | Status: SHIPPED | OUTPATIENT
Start: 2021-10-19 | End: 2021-11-18 | Stop reason: SDUPTHER

## 2021-11-18 ENCOUNTER — TELEPHONE (OUTPATIENT)
Dept: OBGYN CLINIC | Age: 36
End: 2021-11-18

## 2021-11-18 DIAGNOSIS — E28.2 PCOS (POLYCYSTIC OVARIAN SYNDROME): ICD-10-CM

## 2021-11-18 RX ORDER — DROSPIRENONE AND ETHINYL ESTRADIOL 0.03MG-3MG
KIT ORAL
Qty: 28 TABLET | Refills: 1 | Status: SHIPPED | OUTPATIENT
Start: 2021-11-18 | End: 2021-11-19 | Stop reason: SDUPTHER

## 2021-11-19 DIAGNOSIS — E28.2 PCOS (POLYCYSTIC OVARIAN SYNDROME): ICD-10-CM

## 2021-11-19 RX ORDER — DROSPIRENONE AND ETHINYL ESTRADIOL 0.03MG-3MG
KIT ORAL
Qty: 28 TABLET | Refills: 1 | Status: SHIPPED | OUTPATIENT
Start: 2021-11-19 | End: 2021-12-16 | Stop reason: SDUPTHER

## 2021-12-15 ENCOUNTER — OFFICE VISIT (OUTPATIENT)
Dept: OBGYN CLINIC | Age: 36
End: 2021-12-15
Payer: COMMERCIAL

## 2021-12-15 ENCOUNTER — HOSPITAL ENCOUNTER (OUTPATIENT)
Age: 36
Setting detail: SPECIMEN
Discharge: HOME | End: 2021-12-15

## 2021-12-15 VITALS
BODY MASS INDEX: 33.34 KG/M2 | HEART RATE: 74 BPM | SYSTOLIC BLOOD PRESSURE: 125 MMHG | DIASTOLIC BLOOD PRESSURE: 88 MMHG | HEIGHT: 67 IN | WEIGHT: 212.4 LBS

## 2021-12-15 DIAGNOSIS — Z01.419 ENCOUNTER FOR WELL WOMAN EXAM WITH ROUTINE GYNECOLOGICAL EXAM: Primary | ICD-10-CM

## 2021-12-15 DIAGNOSIS — Z11.3 SCREENING EXAMINATION FOR STD (SEXUALLY TRANSMITTED DISEASE): ICD-10-CM

## 2021-12-15 PROCEDURE — 99395 PREV VISIT EST AGE 18-39: CPT | Performed by: OBSTETRICS & GYNECOLOGY

## 2021-12-15 ASSESSMENT — ENCOUNTER SYMPTOMS
BACK PAIN: 0
ABDOMINAL PAIN: 0
COUGH: 0
SHORTNESS OF BREATH: 0

## 2021-12-15 NOTE — PROGRESS NOTES
[facility] PHYSICIANS  [facility] - [address] [facility]  Dept: [phone]    Chief complaint:   Chief Complaint   Patient presents with    Annual Exam     last pap 4/15/2019 N        History Present Illness: Arielle Camejo is a 40 yo female who presents for her annual exam.  She has PCOS and stopped the metformin because of bowel issues. She is doing well on her OCPs. She is thinking she might want to take them continuously. She found out that her partner was having an affair and she would like STI testing. She also thinks she might have a UTI. She is having a clear discharge with an odor. She says that she is also having some spots that itch on the vulva. She does have recurrent BV as well and takes flagyl monthly. She denies any dyspareunia. She denies any bowel or bladder issues. Current Medications (OTC/Herbal):   Current Outpatient Medications   Medication Sig Dispense Refill    drospirenone-ethinyl estradiol 3-0.03 MG TABS take 1 tablet by mouth once daily 28 tablet 1    buPROPion (WELLBUTRIN XL) 150 MG extended release tablet Take 1 tablet by mouth every morning (Patient not taking: Reported on 12/15/2021) 30 tablet 3    metFORMIN (GLUCOPHAGE) 500 MG tablet take 1 tablet by mouth once daily WITH BREAKFAST (Patient not taking: Reported on 12/15/2021) 60 tablet 3    spironolactone (ALDACTONE) 25 MG tablet Take 1 tablet by mouth daily (Patient not taking: Reported on 12/15/2021) 90 tablet 1    ketoconazole (NIZORAL) 2 % shampoo Apply topically daily as needed. (Patient not taking: Reported on 12/15/2021) 2 Bottle 3     No current facility-administered medications for this visit. Allergies:    Allergies   Allergen Reactions    Bactrim [Sulfamethoxazole-Trimethoprim] Hives     Past Medical History:   Past Medical History:   Diagnosis Date    Amenorrhea     Back pain     Chronic rhinitis     Depression     Headache(784.0)     due to allergies  IFG (impaired fasting glucose)     Low vitamin D level     Obesity (BMI 30.0-34. 9)     PCO (polycystic ovaries)     Seborrheic dermatitis     Sleep apnea      Past Surgical History:   Past Surgical History:   Procedure Laterality Date    FOOT SURGERY Right     FOOT SURGERY Right 14    WISDOM TOOTH EXTRACTION       Obstetric History:   2  Para 2  Gynecologic History: LMP 11/10/21   Menarche 15  Duration 7 d    Interval q  28 d  Tampons/Pads in a day: 4-6  Last Pap: 4/15/19       Any history of abnormal paps HPV pos in the past    PriorColpo/Biopsy n/a     Last Mammogram n/a  Contraception: Ocps  Complications: none  STDs: HPV, chlamydia at 22  Psychosocial History: Occupation:   Nursing home   Caffeine Yes    At risk for depression Yes    Abuse:   No  Seatbelt:   Yes  Exercise:  No    Social History     Socioeconomic History    Marital status:      Spouse name: Not on file    Number of children: Not on file    Years of education: Not on file    Highest education level: Not on file   Occupational History    Not on file   Tobacco Use    Smoking status: Never Smoker    Smokeless tobacco: Never Used   Vaping Use    Vaping Use: Never used   Substance and Sexual Activity    Alcohol use: No    Drug use: No    Sexual activity: Yes     Partners: Male   Other Topics Concern    Not on file   Social History Narrative    Not on file     Social Determinants of Health     Financial Resource Strain: Low Risk     Difficulty of Paying Living Expenses: Not hard at all   Food Insecurity: No Food Insecurity    Worried About Running Out of Food in the Last Year: Never true    Mariama of Food in the Last Year: Never true   Transportation Needs:     Lack of Transportation (Medical): Not on file    Lack of Transportation (Non-Medical):  Not on file   Physical Activity:     Days of Exercise per Week: Not on file    Minutes of Exercise per Session: Not on file   Stress:     Feeling of Stress : Not on file   Social Connections:     Frequency of Communication with Friends and Family: Not on file    Frequency of Social Gatherings with Friends and Family: Not on file    Attends Gnosticism Services: Not on file    Active Member of Clubs or Organizations: Not on file    Attends Club or Organization Meetings: Not on file    Marital Status: Not on file   Intimate Partner Violence:     Fear of Current or Ex-Partner: Not on file    Emotionally Abused: Not on file    Physically Abused: Not on file    Sexually Abused: Not on file   Housing Stability:     Unable to Pay for Housing in the Last Year: Not on file    Number of [place] in the Last Year: Not on file    Unstable Housing in the Last Year: Not on file       Family History   Problem Relation Age of Onset    Breast Cancer Neg Hx     Colon Cancer Neg Hx     Uterine Cancer Neg Hx     Ovarian Cancer Neg Hx        Review of Systems:   Review of Systems   Constitutional: Negative for chills and fever. HENT: Negative for congestion. Respiratory: Negative for cough and shortness of breath. Cardiovascular: Negative for chest pain and palpitations. Gastrointestinal: Negative for abdominal pain. Genitourinary: Negative for dyspareunia, pelvic pain and vaginal discharge. Musculoskeletal: Negative for back pain. Neurological: Negative for dizziness and light-headedness. Psychiatric/Behavioral: The patient is not nervous/anxious. Physical exam:  vitals:  Height   5  ft    7 in,  Weight    212 lbs,   125/88 BP  Gen: alert, no apparent distress  HEENT:No pathologic skin lesions noted,NC/AT,PERRL, normal midline nontender thyroid   Lung Exam: Clear to auscultation in all fields bilaterally, without wheezes,rales or rhonchi. Cardiac Exam: Normal sinus rhythm andrate, without murmurs, rubs or gallops appreciated.   Breast Exam: Symmetric without pathological skin changes, nontender without discrete suspicious masses palpated, supraclavicular or axillary adenopathy or nipple discharge noted. Abdominal Exam: Nontender to deep palpation without organomegaly, masses or CVAT appreciated, BS positive. No spinal deformation or tenderness. External Genitalia: Normal development without vulvar,vaginal or cervical lesions noted. 3 spots of vitiligo near the perineum/anus area. Normal vaginal discharge, uterus anterior, 4-6 weeks without CMT. Adnexa nontender without abnormal masses bilaterally. Rectal Exam: Omitted. Extremities: Nontender without clubbing, cyanosis or edema. F.R.O.M. Neurologic Exam: Grossly intact without noted sensorimotor deficits and oriented x 3. Assessment/Plan:   Unremarkable annual Gyn exam.    Cervical Cytology Evaluation begins at 24years old. If Negative Cytology, Follow-up screening per current guidelines. Mammograms every 1year. If 37 yo and last mammogram was negative. Hereditary Breast, Ovarian, Colon and Uterine Cancer screening done. Calcium and Vitamin D dosing reviewed. Hypopigmented areas on the perineum/anus - pt says her niece has vitiligo  Colonoscopy screening reviewed as well as onset for bone density testing. Birth control and barrier recommendations discussed. STD counseling and prevention reviewed. Routine health maintenance per patients PCP.   Pt to follow up for annual exam in 1 year    Pablito Parker MD  [address]

## 2021-12-16 ENCOUNTER — TELEPHONE (OUTPATIENT)
Dept: OBGYN CLINIC | Age: 36
End: 2021-12-16

## 2021-12-16 DIAGNOSIS — E28.2 PCOS (POLYCYSTIC OVARIAN SYNDROME): ICD-10-CM

## 2021-12-16 LAB
C TRACH DNA GENITAL QL NAA+PROBE: NEGATIVE
DIRECT EXAM: ABNORMAL
Lab: ABNORMAL
N. GONORRHOEAE DNA: NEGATIVE
SPECIMEN DESCRIPTION: ABNORMAL
SPECIMEN DESCRIPTION: NORMAL

## 2021-12-16 RX ORDER — DROSPIRENONE AND ETHINYL ESTRADIOL 0.03MG-3MG
KIT ORAL
Qty: 28 TABLET | Refills: 1 | Status: SHIPPED | OUTPATIENT
Start: 2021-12-16 | End: 2022-01-06 | Stop reason: SDUPTHER

## 2021-12-16 NOTE — TELEPHONE ENCOUNTER
Pt states she left a urine culture. Poss UTI. Labs showed an affirm was done and G/C. Did not see anything for UTI. Please advise.

## 2021-12-17 RX ORDER — METRONIDAZOLE 500 MG/1
500 TABLET ORAL 2 TIMES DAILY
Qty: 14 TABLET | Refills: 0 | Status: SHIPPED | OUTPATIENT
Start: 2021-12-17 | End: 2021-12-24

## 2021-12-17 NOTE — TELEPHONE ENCOUNTER
Мария Valadez is calling to request a refill on the following medication(s):    Medication Request:  Requested Prescriptions     Pending Prescriptions Disp Refills    spironolactone (ALDACTONE) 25 MG tablet 90 tablet 1     Sig: Take 1 tablet by mouth daily     Last filled 7/28/20 90 day 1 refill    Last Visit Date (If Applicable):  9/3/0461    Next Visit Date:    Visit date not found

## 2021-12-18 RX ORDER — SPIRONOLACTONE 25 MG/1
25 TABLET ORAL DAILY
Qty: 90 TABLET | Refills: 1 | Status: SHIPPED | OUTPATIENT
Start: 2021-12-18

## 2021-12-20 DIAGNOSIS — R39.9 UTI SYMPTOMS: Primary | ICD-10-CM

## 2022-01-06 DIAGNOSIS — E28.2 PCOS (POLYCYSTIC OVARIAN SYNDROME): ICD-10-CM

## 2022-01-06 RX ORDER — DROSPIRENONE AND ETHINYL ESTRADIOL 0.03MG-3MG
KIT ORAL
Qty: 28 TABLET | Refills: 2 | Status: SHIPPED | OUTPATIENT
Start: 2022-01-06 | End: 2022-01-28 | Stop reason: SDUPTHER

## 2022-01-28 DIAGNOSIS — Z30.40 ENCOUNTER FOR REFILL OF PRESCRIPTION FOR CONTRACEPTION: Primary | ICD-10-CM

## 2022-01-28 DIAGNOSIS — E28.2 PCOS (POLYCYSTIC OVARIAN SYNDROME): ICD-10-CM

## 2022-01-28 RX ORDER — DROSPIRENONE AND ETHINYL ESTRADIOL 0.03MG-3MG
KIT ORAL
Qty: 28 TABLET | Refills: 10 | Status: SHIPPED | OUTPATIENT
Start: 2022-01-28

## 2022-11-18 ENCOUNTER — TELEPHONE (OUTPATIENT)
Dept: OBGYN CLINIC | Age: 37
End: 2022-11-18

## 2022-11-18 DIAGNOSIS — Z30.40 ENCOUNTER FOR REFILL OF PRESCRIPTION FOR CONTRACEPTION: ICD-10-CM

## 2022-11-18 DIAGNOSIS — E28.2 PCOS (POLYCYSTIC OVARIAN SYNDROME): ICD-10-CM

## 2022-11-18 RX ORDER — DROSPIRENONE AND ETHINYL ESTRADIOL 0.03MG-3MG
KIT ORAL
Qty: 28 TABLET | Refills: 10 | Status: SHIPPED | OUTPATIENT
Start: 2022-11-18

## 2022-11-18 NOTE — TELEPHONE ENCOUNTER
Pt called she is wondering if she can get a refill on her [facility] please advise

## 2023-08-08 DIAGNOSIS — E28.2 PCOS (POLYCYSTIC OVARIAN SYNDROME): ICD-10-CM

## 2023-08-08 DIAGNOSIS — Z30.40 ENCOUNTER FOR REFILL OF PRESCRIPTION FOR CONTRACEPTION: ICD-10-CM

## 2023-08-08 RX ORDER — DROSPIRENONE AND ETHINYL ESTRADIOL 0.03MG-3MG
KIT ORAL
Qty: 28 TABLET | Refills: 10 | Status: SHIPPED | OUTPATIENT
Start: 2023-08-08

## 2023-09-06 ENCOUNTER — HOSPITAL ENCOUNTER (OUTPATIENT)
Age: 38
Setting detail: SPECIMEN
Discharge: HOME | End: 2023-09-06

## 2023-09-06 ENCOUNTER — OFFICE VISIT (OUTPATIENT)
Dept: OBGYN CLINIC | Age: 38
End: 2023-09-06
Payer: COMMERCIAL

## 2023-09-06 VITALS
HEIGHT: 67 IN | BODY MASS INDEX: 34.53 KG/M2 | DIASTOLIC BLOOD PRESSURE: 72 MMHG | WEIGHT: 220 LBS | SYSTOLIC BLOOD PRESSURE: 128 MMHG

## 2023-09-06 DIAGNOSIS — Z30.40 ENCOUNTER FOR REFILL OF PRESCRIPTION FOR CONTRACEPTION: ICD-10-CM

## 2023-09-06 DIAGNOSIS — L80 VITILIGO: ICD-10-CM

## 2023-09-06 DIAGNOSIS — Z12.31 ENCOUNTER FOR SCREENING MAMMOGRAM FOR BREAST CANCER: ICD-10-CM

## 2023-09-06 DIAGNOSIS — N89.8 VAGINAL DISCHARGE: ICD-10-CM

## 2023-09-06 DIAGNOSIS — Z01.419 ENCOUNTER FOR GYNECOLOGICAL EXAMINATION: Primary | ICD-10-CM

## 2023-09-06 DIAGNOSIS — E28.2 PCOS (POLYCYSTIC OVARIAN SYNDROME): ICD-10-CM

## 2023-09-06 PROCEDURE — 99385 PREV VISIT NEW AGE 18-39: CPT | Performed by: NURSE PRACTITIONER

## 2023-09-06 RX ORDER — DROSPIRENONE AND ETHINYL ESTRADIOL 0.03MG-3MG
KIT ORAL
Qty: 28 TABLET | Refills: 16 | Status: SHIPPED | OUTPATIENT
Start: 2023-09-06

## 2023-09-06 RX ORDER — METRONIDAZOLE 7.5 MG/G
1 GEL VAGINAL NIGHTLY
Qty: 70 G | Refills: 0 | Status: SHIPPED | OUTPATIENT
Start: 2023-09-06 | End: 2023-09-11

## 2023-09-06 ASSESSMENT — ENCOUNTER SYMPTOMS
GASTROINTESTINAL NEGATIVE: 1
COUGH: 0
RESPIRATORY NEGATIVE: 1
ALLERGIC/IMMUNOLOGIC NEGATIVE: 1
SHORTNESS OF BREATH: 0
ABDOMINAL DISTENTION: 0
BACK PAIN: 0

## 2023-09-06 NOTE — PROGRESS NOTES
The patient is being seen for a physical exam.   The patient was asked if they would like a chaperone in the room during the exam..  The patient has respectfully declined
once daily. She takes continuously and skips the placebo week. Needs new rx every 3 weeks, Disp-28 tablet, R-16Normal  4. Encounter for refill of prescription for contraception  -     drospirenone-ethinyl estradiol 3-0.03 MG TABS; take 1 tablet by mouth once daily. She takes continuously and skips the placebo week. Needs new rx every 3 weeks, Disp-28 tablet, R-16Normal  5. Vaginal discharge  -     metroNIDAZOLE (METROGEL) 0.75 % vaginal gel; Place 1 Applicatorful vaginally at bedtime for 5 days, Vaginal, Nightly Starting Wed 9/6/2023, Until Mon 9/11/2023, For 5 days, Disp-70 g, R-0, Normal  6. Vitiligo           We discussed a trial of hydrocortisone cream sparingly to control the itching    - Pap collected per ASCCP Guidelines. - Birth control discussed. - Smoking risk factors discussed  - Diet and exercise reviewed. - Routine health maintenance per patient's PCP.     Return in about 1 year (around 9/6/2024) for annual exam.        Electronically signed by REED Jimenez CNP on 9/6/23 at 12:24 PM EDT

## 2023-09-08 LAB
HPV I/H RISK 4 DNA CVX QL NAA+PROBE: NOT DETECTED
HPV SAMPLE: NORMAL
HPV, INTERPRETATION: NORMAL
HPV16 DNA CVX QL NAA+PROBE: NOT DETECTED
HPV18 DNA CVX QL NAA+PROBE: NOT DETECTED
SPECIMEN DESCRIPTION: NORMAL

## 2023-09-12 LAB — CYTOLOGY REPORT: NORMAL

## 2023-10-05 DIAGNOSIS — Z30.40 ENCOUNTER FOR REFILL OF PRESCRIPTION FOR CONTRACEPTION: ICD-10-CM

## 2023-10-05 DIAGNOSIS — E28.2 PCOS (POLYCYSTIC OVARIAN SYNDROME): ICD-10-CM

## 2023-10-05 RX ORDER — DROSPIRENONE AND ETHINYL ESTRADIOL 0.03MG-3MG
KIT ORAL
Qty: 3 PACKET | Refills: 4 | Status: SHIPPED | OUTPATIENT
Start: 2023-10-05

## 2024-06-15 ASSESSMENT — PATIENT HEALTH QUESTIONNAIRE - PHQ9
8. MOVING OR SPEAKING SO SLOWLY THAT OTHER PEOPLE COULD HAVE NOTICED. OR THE OPPOSITE - BEING SO FIDGETY OR RESTLESS THAT YOU HAVE BEEN MOVING AROUND A LOT MORE THAN USUAL: SEVERAL DAYS
2. FEELING DOWN, DEPRESSED OR HOPELESS: SEVERAL DAYS
SUM OF ALL RESPONSES TO PHQ QUESTIONS 1-9: 11
7. TROUBLE CONCENTRATING ON THINGS, SUCH AS READING THE NEWSPAPER OR WATCHING TELEVISION: NEARLY EVERY DAY
1. LITTLE INTEREST OR PLEASURE IN DOING THINGS: MORE THAN HALF THE DAYS
4. FEELING TIRED OR HAVING LITTLE ENERGY: SEVERAL DAYS
2. FEELING DOWN, DEPRESSED OR HOPELESS: SEVERAL DAYS
3. TROUBLE FALLING OR STAYING ASLEEP: SEVERAL DAYS
3. TROUBLE FALLING OR STAYING ASLEEP: SEVERAL DAYS
9. THOUGHTS THAT YOU WOULD BE BETTER OFF DEAD, OR OF HURTING YOURSELF: NOT AT ALL
10. IF YOU CHECKED OFF ANY PROBLEMS, HOW DIFFICULT HAVE THESE PROBLEMS MADE IT FOR YOU TO DO YOUR WORK, TAKE CARE OF THINGS AT HOME, OR GET ALONG WITH OTHER PEOPLE: SOMEWHAT DIFFICULT
9. THOUGHTS THAT YOU WOULD BE BETTER OFF DEAD, OR OF HURTING YOURSELF: NOT AT ALL
SUM OF ALL RESPONSES TO PHQ9 QUESTIONS 1 & 2: 3
SUM OF ALL RESPONSES TO PHQ QUESTIONS 1-9: 11
SUM OF ALL RESPONSES TO PHQ QUESTIONS 1-9: 11
6. FEELING BAD ABOUT YOURSELF - OR THAT YOU ARE A FAILURE OR HAVE LET YOURSELF OR YOUR FAMILY DOWN: NOT AT ALL
6. FEELING BAD ABOUT YOURSELF - OR THAT YOU ARE A FAILURE OR HAVE LET YOURSELF OR YOUR FAMILY DOWN: NOT AT ALL
SUM OF ALL RESPONSES TO PHQ QUESTIONS 1-9: 11
5. POOR APPETITE OR OVEREATING: MORE THAN HALF THE DAYS
1. LITTLE INTEREST OR PLEASURE IN DOING THINGS: MORE THAN HALF THE DAYS
10. IF YOU CHECKED OFF ANY PROBLEMS, HOW DIFFICULT HAVE THESE PROBLEMS MADE IT FOR YOU TO DO YOUR WORK, TAKE CARE OF THINGS AT HOME, OR GET ALONG WITH OTHER PEOPLE: SOMEWHAT DIFFICULT
4. FEELING TIRED OR HAVING LITTLE ENERGY: SEVERAL DAYS
5. POOR APPETITE OR OVEREATING: MORE THAN HALF THE DAYS
SUM OF ALL RESPONSES TO PHQ QUESTIONS 1-9: 11
8. MOVING OR SPEAKING SO SLOWLY THAT OTHER PEOPLE COULD HAVE NOTICED. OR THE OPPOSITE, BEING SO FIGETY OR RESTLESS THAT YOU HAVE BEEN MOVING AROUND A LOT MORE THAN USUAL: SEVERAL DAYS
7. TROUBLE CONCENTRATING ON THINGS, SUCH AS READING THE NEWSPAPER OR WATCHING TELEVISION: NEARLY EVERY DAY

## 2024-06-19 ENCOUNTER — OFFICE VISIT (OUTPATIENT)
Dept: FAMILY MEDICINE CLINIC | Age: 39
End: 2024-06-19
Payer: COMMERCIAL

## 2024-06-19 ENCOUNTER — HOSPITAL ENCOUNTER (OUTPATIENT)
Age: 39
Setting detail: SPECIMEN
Discharge: HOME | End: 2024-06-19

## 2024-06-19 VITALS
DIASTOLIC BLOOD PRESSURE: 72 MMHG | BODY MASS INDEX: 35.53 KG/M2 | HEIGHT: 67 IN | RESPIRATION RATE: 14 BRPM | WEIGHT: 226.4 LBS | TEMPERATURE: 97.4 F | OXYGEN SATURATION: 98 % | HEART RATE: 80 BPM | SYSTOLIC BLOOD PRESSURE: 124 MMHG

## 2024-06-19 DIAGNOSIS — Z00.00 ENCOUNTER FOR WELL ADULT EXAM WITHOUT ABNORMAL FINDINGS: Primary | ICD-10-CM

## 2024-06-19 DIAGNOSIS — Z00.00 ENCOUNTER FOR WELL ADULT EXAM WITHOUT ABNORMAL FINDINGS: ICD-10-CM

## 2024-06-19 DIAGNOSIS — E78.2 MIXED HYPERLIPIDEMIA: ICD-10-CM

## 2024-06-19 DIAGNOSIS — E28.2 PCO (POLYCYSTIC OVARIES): ICD-10-CM

## 2024-06-19 DIAGNOSIS — J31.0 CHRONIC RHINITIS: ICD-10-CM

## 2024-06-19 DIAGNOSIS — F31.9 BIPOLAR 1 DISORDER (HCC): ICD-10-CM

## 2024-06-19 DIAGNOSIS — E66.9 OBESITY (BMI 30.0-34.9): ICD-10-CM

## 2024-06-19 DIAGNOSIS — Z71.89 ACP (ADVANCE CARE PLANNING): ICD-10-CM

## 2024-06-19 DIAGNOSIS — R53.83 FATIGUE, UNSPECIFIED TYPE: ICD-10-CM

## 2024-06-19 DIAGNOSIS — R73.01 IFG (IMPAIRED FASTING GLUCOSE): ICD-10-CM

## 2024-06-19 DIAGNOSIS — Z28.21 COVID-19 VACCINATION DECLINED: ICD-10-CM

## 2024-06-19 DIAGNOSIS — L21.9 SEBORRHEIC DERMATITIS: ICD-10-CM

## 2024-06-19 LAB
25(OH)D3 SERPL-MCNC: 7.7 NG/ML (ref 30–100)
ALBUMIN SERPL-MCNC: 3.8 G/DL (ref 3.5–5.2)
ALBUMIN/GLOB SERPL: 1 {RATIO} (ref 1–2.5)
ALP SERPL-CCNC: 54 U/L (ref 35–104)
ALT SERPL-CCNC: 12 U/L (ref 10–35)
ANION GAP SERPL CALCULATED.3IONS-SCNC: 12 MMOL/L (ref 9–16)
AST SERPL-CCNC: 23 U/L (ref 10–35)
BILIRUB SERPL-MCNC: 0.2 MG/DL (ref 0–1.2)
BUN SERPL-MCNC: 10 MG/DL (ref 6–20)
CALCIUM SERPL-MCNC: 8.7 MG/DL (ref 8.6–10.4)
CHLORIDE SERPL-SCNC: 106 MMOL/L (ref 98–107)
CHOLEST SERPL-MCNC: 144 MG/DL (ref 0–199)
CHOLESTEROL/HDL RATIO: 2
CO2 SERPL-SCNC: 21 MMOL/L (ref 20–31)
CREAT SERPL-MCNC: 1 MG/DL (ref 0.5–0.9)
ERYTHROCYTE [DISTWIDTH] IN BLOOD BY AUTOMATED COUNT: 13.2 % (ref 11.8–14.4)
EST. AVERAGE GLUCOSE BLD GHB EST-MCNC: 111 MG/DL
GFR, ESTIMATED: 77 ML/MIN/1.73M2
GLUCOSE SERPL-MCNC: 85 MG/DL (ref 74–99)
HAV IGM SERPL QL IA: NONREACTIVE
HBA1C MFR BLD: 5.5 % (ref 4–6)
HBV CORE IGM SERPL QL IA: NONREACTIVE
HBV SURFACE AG SERPL QL IA: NONREACTIVE
HCT VFR BLD AUTO: 35.7 % (ref 36.3–47.1)
HCV AB SERPL QL IA: NONREACTIVE
HDLC SERPL-MCNC: 86 MG/DL
HGB BLD-MCNC: 11.4 G/DL (ref 11.9–15.1)
LDLC SERPL CALC-MCNC: 30 MG/DL (ref 0–100)
MCH RBC QN AUTO: 28.7 PG (ref 25.2–33.5)
MCHC RBC AUTO-ENTMCNC: 31.9 G/DL (ref 28.4–34.8)
MCV RBC AUTO: 89.9 FL (ref 82.6–102.9)
NRBC BLD-RTO: 0 PER 100 WBC
PLATELET # BLD AUTO: 356 K/UL (ref 138–453)
PMV BLD AUTO: 11.6 FL (ref 8.1–13.5)
POTASSIUM SERPL-SCNC: 3.8 MMOL/L (ref 3.7–5.3)
PROT SERPL-MCNC: 7 G/DL (ref 6.6–8.7)
RBC # BLD AUTO: 3.97 M/UL (ref 3.95–5.11)
SODIUM SERPL-SCNC: 139 MMOL/L (ref 136–145)
T PALLIDUM AB SER QL IA: NONREACTIVE
TRIGL SERPL-MCNC: 139 MG/DL
TSH SERPL DL<=0.05 MIU/L-ACNC: 1.82 UIU/ML (ref 0.27–4.2)
VLDLC SERPL CALC-MCNC: 28 MG/DL
WBC OTHER # BLD: 5.7 K/UL (ref 3.5–11.3)

## 2024-06-19 PROCEDURE — 99386 PREV VISIT NEW AGE 40-64: CPT | Performed by: FAMILY MEDICINE

## 2024-06-19 SDOH — ECONOMIC STABILITY: HOUSING INSECURITY
IN THE LAST 12 MONTHS, WAS THERE A TIME WHEN YOU DID NOT HAVE A STEADY PLACE TO SLEEP OR SLEPT IN A SHELTER (INCLUDING NOW)?: NO

## 2024-06-19 SDOH — ECONOMIC STABILITY: FOOD INSECURITY: WITHIN THE PAST 12 MONTHS, YOU WORRIED THAT YOUR FOOD WOULD RUN OUT BEFORE YOU GOT MONEY TO BUY MORE.: NEVER TRUE

## 2024-06-19 SDOH — ECONOMIC STABILITY: INCOME INSECURITY: HOW HARD IS IT FOR YOU TO PAY FOR THE VERY BASICS LIKE FOOD, HOUSING, MEDICAL CARE, AND HEATING?: NOT VERY HARD

## 2024-06-19 SDOH — ECONOMIC STABILITY: FOOD INSECURITY: WITHIN THE PAST 12 MONTHS, THE FOOD YOU BOUGHT JUST DIDN'T LAST AND YOU DIDN'T HAVE MONEY TO GET MORE.: NEVER TRUE

## 2024-06-19 NOTE — PATIENT INSTRUCTIONS
weight may be enough to improve your health.  Get family and friends involved to provide support. Talk to them about why you are trying to lose weight, and ask them to help. They can help by participating in exercise and having meals with you, even if they may be eating something different.  Find what works best for you. If you do not have time or do not like to cook, a program that offers meal replacement bars or shakes may be better for you. Or if you like to prepare meals, finding a plan that includes daily menus and recipes may be best.  Ask your doctor about other health professionals who can help you achieve your weight loss goals.  A dietitian can help you make healthy changes in your diet.  An exercise specialist or  can help you develop a safe and effective exercise program.  A counselor or psychiatrist can help you cope with issues such as depression, anxiety, or family problems that can make it hard to focus on weight loss.  Consider joining a support group for people who are trying to lose weight. Your doctor can suggest groups in your area.  Where can you learn more?  Go to https://www.[employer].net/patientEd and enter U357 to learn more about \"Starting a Weight Loss Plan: Care Instructions.\"  Current as of: September 20, 2023  Content Version: 14.1  © 2006-2024 [employer].   Care instructions adapted under license by [employer]. If you have questions about a medical condition or this instruction, always ask your healthcare professional. [employer] disclaims any warranty or liability for your use of this information.

## 2024-06-19 NOTE — PROGRESS NOTES
Well Adult Note  Name: Isidro Rodriguez Today’s Date: 2024   MRN: [id number] Sex: Female   Age: 39 y.o. Ethnicity: Non- / Non    : 1985 Race: Black / African American      Isidro Rodriguez is here for well adult exam.  History:   Diagnosis Orders   1. Encounter for well adult exam without abnormal findings  Urine Drug Screen    HIV Rapid 1&2    T. pallidum Ab    Hepatitis Panel, Acute    CBC    Comprehensive Metabolic Panel    Hemoglobin A1C    Lipid Panel    TSH    Vitamin D 25 Hydroxy      2. ACP (advance care planning)        3. Bipolar 1 disorder (HCC)  Urine Drug Screen    HIV Rapid 1&2    T. pallidum Ab    Hepatitis Panel, Acute    CBC    Comprehensive Metabolic Panel    Hemoglobin A1C    Lipid Panel    TSH    Vitamin D 25 Hydroxy      4. Seborrheic dermatitis        5. Chronic rhinitis        6. PCO (polycystic ovaries)        7. IFG (impaired fasting glucose)  Urine Drug Screen    HIV Rapid 1&2    T. pallidum Ab    Hepatitis Panel, Acute    CBC    Comprehensive Metabolic Panel    Hemoglobin A1C    Lipid Panel    TSH    Vitamin D 25 Hydroxy      8. Obesity (BMI 30.0-34.9)  Urine Drug Screen    HIV Rapid 1&2    T. pallidum Ab    Hepatitis Panel, Acute    CBC    Comprehensive Metabolic Panel    Hemoglobin A1C    Lipid Panel    TSH    Vitamin D 25 Hydroxy      9. Mixed hyperlipidemia  Urine Drug Screen    HIV Rapid 1&2    T. pallidum Ab    Hepatitis Panel, Acute    CBC    Comprehensive Metabolic Panel    Hemoglobin A1C    Lipid Panel    TSH    Vitamin D 25 Hydroxy      10. COVID-19 vaccination declined        11. Fatigue, unspecified type  Urine Drug Screen    HIV Rapid 1&2    T. pallidum Ab    Hepatitis Panel, Acute    CBC    Comprehensive Metabolic Panel    Hemoglobin A1C    Lipid Panel    TSH    Vitamin D 25 Hydroxy        This is a 39-year-old -American female who is presented to establish care.  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

## 2024-06-24 RX ORDER — ERGOCALCIFEROL 1.25 MG/1
50000 CAPSULE ORAL WEEKLY
Qty: 12 CAPSULE | Refills: 1 | Status: SHIPPED | OUTPATIENT
Start: 2024-06-24

## 2024-06-26 ENCOUNTER — TELEPHONE (OUTPATIENT)
Dept: FAMILY MEDICINE CLINIC | Age: 39
End: 2024-06-26

## 2024-06-26 NOTE — TELEPHONE ENCOUNTER
Regarding: FW: Lab Results   Contact: [phone]        ----- Message -----  From: Katy Lino MA  Sent: 6/24/2024  10:52 AM EDT  To: Edward Cano MD  Subject: Lab Results                                      ----- Message from Katy Lino MA sent at 6/24/2024 10:52 AM EDT -----       ----- Message from Isidro Phipps to Edward Cano MD sent at 6/23/2024  1:02 PM -----   Good Morning. Checking to see if Dr. Cano ordered anything for my Vitamin D and Creatinine results.

## 2024-06-26 NOTE — TELEPHONE ENCOUNTER
Regarding: FW: Lab Results   Contact: [phone]        ----- Message -----  From: Katy Lino MA  Sent: 6/24/2024  10:52 AM EDT  To: Edward Cano MD  Subject: Lab Results                                      ----- Message from Katy Lino MA sent at 6/24/2024 10:52 AM EDT -----       ----- Message from Isidro Phipps to Edward Cano MD sent at 6/23/2024  1:02 PM -----   Good Morning. Checking to see if Dr. Cano ordered anything for my Vitamin D and Creatinine results.

## 2024-07-19 ENCOUNTER — OFFICE VISIT (OUTPATIENT)
Dept: FAMILY MEDICINE CLINIC | Age: 39
End: 2024-07-19
Payer: COMMERCIAL

## 2024-07-19 VITALS
RESPIRATION RATE: 12 BRPM | SYSTOLIC BLOOD PRESSURE: 122 MMHG | DIASTOLIC BLOOD PRESSURE: 86 MMHG | HEIGHT: 67 IN | OXYGEN SATURATION: 96 % | HEART RATE: 81 BPM | TEMPERATURE: 97.1 F | BODY MASS INDEX: 35.22 KG/M2 | WEIGHT: 224.4 LBS

## 2024-07-19 DIAGNOSIS — F31.9 BIPOLAR 1 DISORDER (HCC): Primary | ICD-10-CM

## 2024-07-19 DIAGNOSIS — L21.9 SEBORRHEIC DERMATITIS: ICD-10-CM

## 2024-07-19 DIAGNOSIS — R73.01 IFG (IMPAIRED FASTING GLUCOSE): ICD-10-CM

## 2024-07-19 DIAGNOSIS — E66.9 OBESITY (BMI 30.0-34.9): ICD-10-CM

## 2024-07-19 DIAGNOSIS — J31.0 CHRONIC RHINITIS: ICD-10-CM

## 2024-07-19 DIAGNOSIS — E78.2 MIXED HYPERLIPIDEMIA: ICD-10-CM

## 2024-07-19 DIAGNOSIS — E28.2 PCO (POLYCYSTIC OVARIES): ICD-10-CM

## 2024-07-19 PROBLEM — Z28.21 COVID-19 VACCINATION DECLINED: Status: RESOLVED | Noted: 2021-09-02 | Resolved: 2024-07-19

## 2024-07-19 PROCEDURE — 99214 OFFICE O/P EST MOD 30 MIN: CPT | Performed by: FAMILY MEDICINE

## 2024-07-19 RX ORDER — ERGOCALCIFEROL 1.25 MG/1
50000 CAPSULE ORAL WEEKLY
Qty: 12 CAPSULE | Refills: 1 | Status: SHIPPED | OUTPATIENT
Start: 2024-07-19

## 2024-07-19 RX ORDER — MULTIVIT-MIN/IRON FUM/FOLIC AC 7.5 MG-4
1 TABLET ORAL DAILY
Qty: 200 TABLET | Refills: 0 | Status: SHIPPED | OUTPATIENT
Start: 2024-07-19

## 2024-07-19 RX ORDER — CITALOPRAM HYDROBROMIDE 10 MG/1
10 TABLET ORAL DAILY
Qty: 90 TABLET | Refills: 0 | Status: SHIPPED | OUTPATIENT
Start: 2024-07-19

## 2024-07-19 ASSESSMENT — ENCOUNTER SYMPTOMS
EYES NEGATIVE: 1
RESPIRATORY NEGATIVE: 1
ALLERGIC/IMMUNOLOGIC NEGATIVE: 1
GASTROINTESTINAL NEGATIVE: 1

## 2024-07-19 NOTE — PROGRESS NOTES
Subjective:      Patient ID: Isidro Rodriguez is a 39 y.o. female.    Mental Health Problem  The primary symptoms include dysphoric mood and somatic symptoms. The current episode started more than 1 month ago. This is a chronic problem.   Somatic symptoms include fatigue, headaches and myalgias.   The onset of the illness is precipitated by emotional stress. The degree of incapacity that she is experiencing as a consequence of her illness is moderate. Additional symptoms of the illness include anhedonia, fatigue, attention impairment, distractible and headaches. She does not admit to suicidal ideas. She does not have a plan to attempt suicide. She does not contemplate harming herself. She has not already injured self. She does not contemplate injuring another person. She has not already  injured another person. Risk factors that are present for mental illness include a history of mental illness.       Review of Systems   Constitutional:  Positive for fatigue.   HENT: Negative.     Eyes: Negative.    Respiratory: Negative.     Cardiovascular: Negative.    Gastrointestinal: Negative.    Endocrine: Negative.    Musculoskeletal:  Positive for myalgias.   Skin: Negative.    Allergic/Immunologic: Negative.    Neurological:  Positive for headaches.   Hematological: Negative.    Psychiatric/Behavioral:  Positive for decreased concentration and dysphoric mood.      [facility]  Objective:   Physical Exam  Vitals and nursing note reviewed.   Constitutional:       Appearance: She is well-developed.   HENT:      Head: Normocephalic and atraumatic.      Right Ear: External ear normal.      Left Ear: External ear normal.   Eyes:      Conjunctiva/sclera: Conjunctivae normal.      Pupils: Pupils are equal, round, and reactive to light.   Cardiovascular:      Rate and Rhythm: Normal rate and regular rhythm.      Heart sounds: Normal heart sounds.      Comments: Dyslip  IFG  Pulmonary:      Effort: Pulmonary effort is normal.

## 2025-01-12 NOTE — PROGRESS NOTES
Problem: At Risk for Falls  Goal: Patient does not fall  Outcome: Monitoring/Evaluating progress  Goal: Patient takes action to control fall-related risks  Outcome: Monitoring/Evaluating progress     Problem: Diabetes  Goal: Achieves glycemic balance  Description: Goal is to maintain blood sugar within range with no episodes of hypoglycemia  Outcome: Monitoring/Evaluating progress  Goal: Verbalizes/demonstrates understanding of NEW diagnosis of diabetes and management  Description: Document on Patient Education Activity  Outcome: Monitoring/Evaluating progress  Goal: Verbalizes understanding of diabetes management including how to use HbA1C to evaluate status of blood sugar over time (Diabetes is NOT a new diagnosis)  Description: Diabetes Education  Outcome: Monitoring/Evaluating progress  Goal: Demonstrates ability to self-administer insulin  Description: Document on Patient Education Activity  Outcome: Monitoring/Evaluating progress     Problem: Pain  Goal: Acceptable pain level achieved/maintained at rest using appropriate pain scale for the patient  Outcome: Monitoring/Evaluating progress  Goal: Acceptable pain level achieved/maintained with activity using appropriate pain scale for the patient  Outcome: Monitoring/Evaluating progress  Goal: Acceptable pain level achieved/maintained without oversedation  Outcome: Monitoring/Evaluating progress      Constitutional: Negative. Skin: Denies any new changing, growing or bleeding lesions or rashes except as described in the HPI     PHYSICAL EXAM:   /85 (Site: Right Arm, Position: Sitting, Cuff Size: Large Adult)   Pulse 61   Ht 5' 7\" (1.702 m)   Wt 219 lb 9.6 oz (99.6 kg)   SpO2 100%   BMI 34.39 kg/m²     General Exam:  General Appearance: No acute distress, Well nourished     Neuro: Alert  Psych: Not Performed   Lymph Node: Not performed    Cutaneous Exam: Performed as documented in clinic note below. Sun-exposed skin, which includes the head/face, neck, both arms, digits and/or nails was examined. Pertinent Physical Exam Findings:  Physical Exam   Skin:            Medical Necessity of Exam Performed:   Distribution of patient concerns    Additional Diagnostic Testing performed during exam: Not performed ,  Not performed    ASSESSMENT:  1. Seborrheic dermatitis     2. Tinea versicolor         Plan of Action is as Follows:  Assessment 1. Seborrheic dermatitis  - not using prescribed medications because not all were provided by pharmacy  - Reordered medications lidex soln and T/Sal (OTC) encouraged pt to call if she has pharmacy problems    2. Tinea versicolor  Improving - continue ketoconazole cream          Patient Instructions   It is possible your prescription(s) from today's visit will require a prior authorization. If your insurance requires a prior authorization or is too costly to fill, please notify our office as soon as possible so we may take the appropriate action. 1. Continue applying ketoconazole cream to affected areas on scalp  2. Apply steroid solution to the scalp, please let the office know if prior auth is needed  3. Continue Tsal shampoo  4.  Follow up in the office in 3 months      Photo surveillance performed: No    Follow-up: 3 months    This note was created with the assistance of a speech-recognition program.  Although the intention is to generate a document that actually reflects the content of the visit, no guarantees can be provided that every mistake has been identified and corrected by editing.     Electronically signed by Elisha Noel MD on 2/1/18 at 8:22 AM

## 2025-02-03 RX ORDER — DROSPIRENONE AND ETHINYL ESTRADIOL 0.03MG-3MG
1 KIT ORAL DAILY
COMMUNITY
End: 2025-02-03 | Stop reason: SDUPTHER

## 2025-02-03 RX ORDER — DROSPIRENONE AND ETHINYL ESTRADIOL 0.03MG-3MG
1 KIT ORAL DAILY
Qty: 1 PACKET | Refills: 2 | Status: SHIPPED | OUTPATIENT
Start: 2025-02-03

## 2025-02-26 RX ORDER — DROSPIRENONE AND ETHINYL ESTRADIOL 0.03MG-3MG
1 KIT ORAL DAILY
Qty: 1 PACKET | Refills: 1 | Status: SHIPPED | OUTPATIENT
Start: 2025-02-26

## 2025-02-26 NOTE — TELEPHONE ENCOUNTER
GYN pt who was just sent a script for her bc to hold her until her appt on 3/25/25     The script does not say skip placebo week and the pharmacy told her she will need a whole new script with those directions.

## 2025-03-24 ENCOUNTER — OFFICE VISIT (OUTPATIENT)
Dept: OBGYN CLINIC | Age: 40
End: 2025-03-24
Payer: COMMERCIAL

## 2025-03-24 ENCOUNTER — TELEPHONE (OUTPATIENT)
Dept: OBGYN CLINIC | Age: 40
End: 2025-03-24

## 2025-03-24 VITALS
WEIGHT: 229.2 LBS | DIASTOLIC BLOOD PRESSURE: 87 MMHG | HEIGHT: 67 IN | SYSTOLIC BLOOD PRESSURE: 136 MMHG | BODY MASS INDEX: 35.97 KG/M2

## 2025-03-24 DIAGNOSIS — Z01.419 ENCOUNTER FOR GYNECOLOGICAL EXAMINATION: Primary | ICD-10-CM

## 2025-03-24 DIAGNOSIS — E28.2 PCOS (POLYCYSTIC OVARIAN SYNDROME): ICD-10-CM

## 2025-03-24 DIAGNOSIS — L80 VITILIGO: ICD-10-CM

## 2025-03-24 DIAGNOSIS — Z12.31 ENCOUNTER FOR SCREENING MAMMOGRAM FOR BREAST CANCER: ICD-10-CM

## 2025-03-24 DIAGNOSIS — N89.8 VAGINAL DISCHARGE: ICD-10-CM

## 2025-03-24 PROCEDURE — 99396 PREV VISIT EST AGE 40-64: CPT | Performed by: NURSE PRACTITIONER

## 2025-03-24 RX ORDER — TACROLIMUS 1 MG/G
OINTMENT TOPICAL
Qty: 30 G | Refills: 0 | Status: SHIPPED | OUTPATIENT
Start: 2025-03-24

## 2025-03-24 RX ORDER — DROSPIRENONE AND ETHINYL ESTRADIOL 0.03MG-3MG
KIT ORAL
Qty: 4 PACKET | Refills: 1 | Status: SHIPPED | OUTPATIENT
Start: 2025-03-24

## 2025-03-24 RX ORDER — DROSPIRENONE AND ETHINYL ESTRADIOL 0.03MG-3MG
1 KIT ORAL DAILY
Qty: 1 PACKET | Refills: 1 | Status: SHIPPED | OUTPATIENT
Start: 2025-03-24 | End: 2025-03-24

## 2025-03-24 RX ORDER — METRONIDAZOLE 7.5 MG/G
1 GEL VAGINAL NIGHTLY
Qty: 70 G | Refills: 0 | Status: SHIPPED | OUTPATIENT
Start: 2025-03-24 | End: 2025-03-29

## 2025-03-24 ASSESSMENT — ENCOUNTER SYMPTOMS
SHORTNESS OF BREATH: 0
COUGH: 0
BACK PAIN: 0
GASTROINTESTINAL NEGATIVE: 1
RESPIRATORY NEGATIVE: 1
ALLERGIC/IMMUNOLOGIC NEGATIVE: 1
ABDOMINAL DISTENTION: 0

## 2025-03-24 ASSESSMENT — PATIENT HEALTH QUESTIONNAIRE - PHQ9
SUM OF ALL RESPONSES TO PHQ QUESTIONS 1-9: 8
2. FEELING DOWN, DEPRESSED OR HOPELESS: SEVERAL DAYS
7. TROUBLE CONCENTRATING ON THINGS, SUCH AS READING THE NEWSPAPER OR WATCHING TELEVISION: SEVERAL DAYS
SUM OF ALL RESPONSES TO PHQ QUESTIONS 1-9: 8
10. IF YOU CHECKED OFF ANY PROBLEMS, HOW DIFFICULT HAVE THESE PROBLEMS MADE IT FOR YOU TO DO YOUR WORK, TAKE CARE OF THINGS AT HOME, OR GET ALONG WITH OTHER PEOPLE: SOMEWHAT DIFFICULT
SUM OF ALL RESPONSES TO PHQ QUESTIONS 1-9: 8
6. FEELING BAD ABOUT YOURSELF - OR THAT YOU ARE A FAILURE OR HAVE LET YOURSELF OR YOUR FAMILY DOWN: NOT AT ALL
SUM OF ALL RESPONSES TO PHQ QUESTIONS 1-9: 8
5. POOR APPETITE OR OVEREATING: MORE THAN HALF THE DAYS
5. POOR APPETITE OR OVEREATING: MORE THAN HALF THE DAYS
9. THOUGHTS THAT YOU WOULD BE BETTER OFF DEAD, OR OF HURTING YOURSELF: NOT AT ALL
8. MOVING OR SPEAKING SO SLOWLY THAT OTHER PEOPLE COULD HAVE NOTICED. OR THE OPPOSITE, BEING SO FIGETY OR RESTLESS THAT YOU HAVE BEEN MOVING AROUND A LOT MORE THAN USUAL: NOT AT ALL
10. IF YOU CHECKED OFF ANY PROBLEMS, HOW DIFFICULT HAVE THESE PROBLEMS MADE IT FOR YOU TO DO YOUR WORK, TAKE CARE OF THINGS AT HOME, OR GET ALONG WITH OTHER PEOPLE: SOMEWHAT DIFFICULT
6. FEELING BAD ABOUT YOURSELF - OR THAT YOU ARE A FAILURE OR HAVE LET YOURSELF OR YOUR FAMILY DOWN: NOT AT ALL
SUM OF ALL RESPONSES TO PHQ QUESTIONS 1-9: 8
3. TROUBLE FALLING OR STAYING ASLEEP: MORE THAN HALF THE DAYS
1. LITTLE INTEREST OR PLEASURE IN DOING THINGS: SEVERAL DAYS
3. TROUBLE FALLING OR STAYING ASLEEP: MORE THAN HALF THE DAYS
4. FEELING TIRED OR HAVING LITTLE ENERGY: SEVERAL DAYS
8. MOVING OR SPEAKING SO SLOWLY THAT OTHER PEOPLE COULD HAVE NOTICED. OR THE OPPOSITE - BEING SO FIDGETY OR RESTLESS THAT YOU HAVE BEEN MOVING AROUND A LOT MORE THAN USUAL: NOT AT ALL
7. TROUBLE CONCENTRATING ON THINGS, SUCH AS READING THE NEWSPAPER OR WATCHING TELEVISION: SEVERAL DAYS
2. FEELING DOWN, DEPRESSED OR HOPELESS: SEVERAL DAYS
1. LITTLE INTEREST OR PLEASURE IN DOING THINGS: SEVERAL DAYS
4. FEELING TIRED OR HAVING LITTLE ENERGY: SEVERAL DAYS
9. THOUGHTS THAT YOU WOULD BE BETTER OFF DEAD, OR OF HURTING YOURSELF: NOT AT ALL

## 2025-03-24 NOTE — TELEPHONE ENCOUNTER
----- Message from REED Sanchez CNP sent at 3/24/2025 11:20 AM EDT -----  Regarding: medication  Will you please let her know I ordered and ointment called tacrolimus which she can do BID for 6 weeks.  If it helps she can continue it at a less frequent rate so she should let me know.  It is supposed to work better than hydrocortisone for vitiligo

## 2025-03-24 NOTE — PROGRESS NOTES
[facility] [place]  [address]  [place] [address]  Dept: [phone]      3/24/25    Isidro Rodriguez     Gyn Annual Exam      CHIEF COMPLAINT:    Chief Complaint   Patient presents with    Annual Exam     Last pap 23 WNL HPV-               Blood pressure 136/87, height 1.702 m (5' 7\"), weight 104 kg (229 lb 3.2 oz), last menstrual period 2025, not currently breastfeeding.           HPI :     Isidro Rodriguez 1985 is a 40 y.o.   who is here for her annual exam. She is on ocps which she takes continuously due to pcos and a hx of irregular cycles.  She was dx with vitiligo on the perineum and it now itches. She will put hydrocortisone on it prn. 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  Is filing for divorce as her  is a habitual cheater.  Working on diet and exercise and is now lifting weights    _____________________________________________________________________  Past Medical History:   Diagnosis Date    Amenorrhea     Back pain     Chronic rhinitis     Depression     Headache(784.0)     due to allergies    IFG (impaired fasting glucose)     Infertility, female     Low vitamin D level     Obesity (BMI 30.0-34.9)     PCO (polycystic ovaries)     Seborrheic dermatitis     Sleep apnea                                                                    Past Surgical History:   Procedure Laterality Date    FOOT SURGERY Right     FOOT SURGERY Right 13    WISDOM TOOTH EXTRACTION       Family History   Problem Relation Age of Onset    Heart Surgery Mother     Hypertension Mother     Migraines Mother     Stroke Mother     Liver Disease Mother     Heart Disease Mother     Kidney Disease Mother         stage 3    Breast Cancer Neg Hx     Colon Cancer Neg Hx     Uterine Cancer Neg Hx     Ovarian Cancer Neg Hx      Social History     Tobacco Use   Smoking Status Never

## 2025-04-30 ENCOUNTER — HOSPITAL ENCOUNTER (OUTPATIENT)
Age: 40
Discharge: HOME | End: 2025-05-02
Payer: COMMERCIAL

## 2025-04-30 ENCOUNTER — RESULTS FOLLOW-UP (OUTPATIENT)
Dept: OBGYN CLINIC | Age: 40
End: 2025-04-30

## 2025-04-30 DIAGNOSIS — Z12.31 ENCOUNTER FOR SCREENING MAMMOGRAM FOR BREAST CANCER: ICD-10-CM

## 2025-04-30 PROCEDURE — 77063 BREAST TOMOSYNTHESIS BI: CPT

## 2025-06-04 DIAGNOSIS — E28.2 PCOS (POLYCYSTIC OVARIAN SYNDROME): ICD-10-CM

## 2025-06-05 RX ORDER — DROSPIRENONE AND ETHINYL ESTRADIOL 0.03MG-3MG
KIT ORAL
Qty: 28 TABLET | Refills: 12 | Status: SHIPPED | OUTPATIENT
Start: 2025-06-05

## 2025-06-25 ENCOUNTER — PATIENT MESSAGE (OUTPATIENT)
Dept: FAMILY MEDICINE CLINIC | Age: 40
End: 2025-06-25

## 2025-06-25 ENCOUNTER — HOSPITAL ENCOUNTER (OUTPATIENT)
Age: 40
Setting detail: SPECIMEN
Discharge: HOME | End: 2025-06-25

## 2025-06-25 ENCOUNTER — TELEPHONE (OUTPATIENT)
Dept: FAMILY MEDICINE CLINIC | Age: 40
End: 2025-06-25

## 2025-06-25 ENCOUNTER — OFFICE VISIT (OUTPATIENT)
Dept: FAMILY MEDICINE CLINIC | Age: 40
End: 2025-06-25
Payer: COMMERCIAL

## 2025-06-25 VITALS
HEIGHT: 67 IN | RESPIRATION RATE: 12 BRPM | WEIGHT: 231 LBS | BODY MASS INDEX: 36.26 KG/M2 | HEART RATE: 75 BPM | OXYGEN SATURATION: 96 % | TEMPERATURE: 97.5 F | DIASTOLIC BLOOD PRESSURE: 78 MMHG | SYSTOLIC BLOOD PRESSURE: 116 MMHG

## 2025-06-25 DIAGNOSIS — Z11.4 SCREENING FOR HIV WITHOUT PRESENCE OF RISK FACTORS: ICD-10-CM

## 2025-06-25 DIAGNOSIS — L21.9 SEBORRHEIC DERMATITIS: ICD-10-CM

## 2025-06-25 DIAGNOSIS — E78.2 MIXED HYPERLIPIDEMIA: ICD-10-CM

## 2025-06-25 DIAGNOSIS — R73.01 IFG (IMPAIRED FASTING GLUCOSE): Primary | ICD-10-CM

## 2025-06-25 DIAGNOSIS — E66.811 OBESITY (BMI 30.0-34.9): ICD-10-CM

## 2025-06-25 DIAGNOSIS — J31.0 CHRONIC RHINITIS: ICD-10-CM

## 2025-06-25 DIAGNOSIS — F31.9 BIPOLAR 1 DISORDER (HCC): ICD-10-CM

## 2025-06-25 DIAGNOSIS — E28.2 PCO (POLYCYSTIC OVARIES): ICD-10-CM

## 2025-06-25 DIAGNOSIS — R73.01 IFG (IMPAIRED FASTING GLUCOSE): ICD-10-CM

## 2025-06-25 LAB
ALBUMIN SERPL-MCNC: 3.9 G/DL (ref 3.5–5.2)
ALBUMIN/GLOB SERPL: 1 {RATIO} (ref 1–2.5)
ALP SERPL-CCNC: 55 U/L (ref 35–104)
ALT SERPL-CCNC: 15 U/L (ref 10–35)
ANION GAP SERPL CALCULATED.3IONS-SCNC: 13 MMOL/L (ref 9–16)
AST SERPL-CCNC: 21 U/L (ref 10–35)
BILIRUB SERPL-MCNC: 0.3 MG/DL (ref 0–1.2)
BUN SERPL-MCNC: 12 MG/DL (ref 6–20)
CALCIUM SERPL-MCNC: 9.4 MG/DL (ref 8.6–10.4)
CHLORIDE SERPL-SCNC: 106 MMOL/L (ref 98–107)
CHOLEST SERPL-MCNC: 182 MG/DL (ref 0–199)
CHOLESTEROL/HDL RATIO: 2
CO2 SERPL-SCNC: 20 MMOL/L (ref 20–31)
CREAT SERPL-MCNC: 1 MG/DL (ref 0.6–0.9)
ERYTHROCYTE [DISTWIDTH] IN BLOOD BY AUTOMATED COUNT: 13.4 % (ref 11.8–14.4)
EST. AVERAGE GLUCOSE BLD GHB EST-MCNC: 117 MG/DL
GFR, ESTIMATED: 73 ML/MIN/1.73M2
GLUCOSE SERPL-MCNC: 92 MG/DL (ref 74–99)
HBA1C MFR BLD: 5.7 % (ref 4–6)
HCT VFR BLD AUTO: 35.7 % (ref 36.3–47.1)
HDLC SERPL-MCNC: 89 MG/DL
HGB BLD-MCNC: 11.5 G/DL (ref 11.9–15.1)
HIV 1+2 AB+HIV1 P24 AG SERPL QL IA: NONREACTIVE
LDLC SERPL CALC-MCNC: 60 MG/DL (ref 0–100)
MCH RBC QN AUTO: 28.5 PG (ref 25.2–33.5)
MCHC RBC AUTO-ENTMCNC: 32.2 G/DL (ref 28.4–34.8)
MCV RBC AUTO: 88.4 FL (ref 82.6–102.9)
NRBC BLD-RTO: 0 PER 100 WBC
PLATELET # BLD AUTO: 389 K/UL (ref 138–453)
PMV BLD AUTO: 11.2 FL (ref 8.1–13.5)
POTASSIUM SERPL-SCNC: 3.9 MMOL/L (ref 3.7–5.3)
PROT SERPL-MCNC: 7.8 G/DL (ref 6.6–8.7)
RBC # BLD AUTO: 4.04 M/UL (ref 3.95–5.11)
SODIUM SERPL-SCNC: 139 MMOL/L (ref 136–145)
TRIGL SERPL-MCNC: 167 MG/DL
TSH SERPL DL<=0.05 MIU/L-ACNC: 3.41 UIU/ML (ref 0.27–4.2)
VLDLC SERPL CALC-MCNC: 33 MG/DL (ref 1–30)
WBC OTHER # BLD: 6.5 K/UL (ref 3.5–11.3)

## 2025-06-25 PROCEDURE — 99214 OFFICE O/P EST MOD 30 MIN: CPT | Performed by: FAMILY MEDICINE

## 2025-06-25 RX ORDER — ERGOCALCIFEROL 1.25 MG/1
50000 CAPSULE, LIQUID FILLED ORAL WEEKLY
Qty: 12 CAPSULE | Refills: 1 | Status: SHIPPED | OUTPATIENT
Start: 2025-06-25

## 2025-06-25 SDOH — ECONOMIC STABILITY: FOOD INSECURITY: WITHIN THE PAST 12 MONTHS, THE FOOD YOU BOUGHT JUST DIDN'T LAST AND YOU DIDN'T HAVE MONEY TO GET MORE.: SOMETIMES TRUE

## 2025-06-25 SDOH — ECONOMIC STABILITY: TRANSPORTATION INSECURITY
IN THE PAST 12 MONTHS, HAS LACK OF TRANSPORTATION KEPT YOU FROM MEETINGS, WORK, OR FROM GETTING THINGS NEEDED FOR DAILY LIVING?: NO

## 2025-06-25 SDOH — ECONOMIC STABILITY: INCOME INSECURITY: IN THE LAST 12 MONTHS, WAS THERE A TIME WHEN YOU WERE NOT ABLE TO PAY THE MORTGAGE OR RENT ON TIME?: NO

## 2025-06-25 SDOH — ECONOMIC STABILITY: FOOD INSECURITY: WITHIN THE PAST 12 MONTHS, YOU WORRIED THAT YOUR FOOD WOULD RUN OUT BEFORE YOU GOT MONEY TO BUY MORE.: SOMETIMES TRUE

## 2025-06-25 SDOH — ECONOMIC STABILITY: TRANSPORTATION INSECURITY
IN THE PAST 12 MONTHS, HAS THE LACK OF TRANSPORTATION KEPT YOU FROM MEDICAL APPOINTMENTS OR FROM GETTING MEDICATIONS?: NO

## 2025-06-25 ASSESSMENT — ENCOUNTER SYMPTOMS
ALLERGIC/IMMUNOLOGIC NEGATIVE: 1
GASTROINTESTINAL NEGATIVE: 1
EYES NEGATIVE: 1
RESPIRATORY NEGATIVE: 1

## 2025-06-25 NOTE — TELEPHONE ENCOUNTER
Pharmacy called and stated that the starter dose pack for vraylar is unavailable. 2 separate prescriptions will need to be sent into the pharmacy. Please order.

## 2025-06-25 NOTE — PROGRESS NOTES
Subjective:      Patient ID: Isidro Rodriguez is a 40 y.o. female.    Mental Health Problem  The primary symptoms include dysphoric mood and somatic symptoms. The current episode started more than 1 month ago. This is a chronic problem.   Somatic symptoms include fatigue.   The onset of the illness is precipitated by emotional stress. The degree of incapacity that she is experiencing as a consequence of her illness is mild. Additional symptoms of the illness include anhedonia, fatigue and attention impairment. She does not admit to suicidal ideas. She does not have a plan to attempt suicide. She does not contemplate harming themself. She has not already injured self. She does not contemplate injuring another person. She has not already  injured another person. Risk factors that are present for mental illness include a history of mental illness.       Review of Systems   Constitutional:  Positive for fatigue.   HENT: Negative.     Eyes: Negative.    Respiratory: Negative.     Cardiovascular: Negative.    Gastrointestinal: Negative.    Endocrine: Negative.    Musculoskeletal: Negative.    Skin: Negative.    Allergic/Immunologic: Negative.    Neurological: Negative.    Hematological: Negative.    Psychiatric/Behavioral:  Positive for decreased concentration and dysphoric mood.      Past family and social history unremarkable.   Diagnosis Orders   1. IFG (impaired fasting glucose)  Hemoglobin A1C [LAB90}    CBC    Comprehensive Metabolic Panel    Hemoglobin A1C    Lipid Panel    TSH      2. Screening for HIV without presence of risk factors  HIV Screen      3. Bipolar 1 disorder (HCC)  CBC    Comprehensive Metabolic Panel    Hemoglobin A1C    Lipid Panel    TSH      4. Seborrheic dermatitis        5. Chronic rhinitis        6. PCO (polycystic ovaries)        7. Obesity (BMI 30.0-34.9)        8. Mixed hyperlipidemia  CBC    Comprehensive Metabolic Panel    Hemoglobin A1C    Lipid Panel    TSH          Objective:

## 2025-06-26 NOTE — TELEPHONE ENCOUNTER
Luly to order Mounjaro 2.5 mg subcutaneous weekly #4 with monthly incremental dosing  Creatinine will rather improve with weight loss in response to Mounjaro

## 2025-07-01 ENCOUNTER — TELEPHONE (OUTPATIENT)
Dept: FAMILY MEDICINE CLINIC | Age: 40
End: 2025-07-01

## 2025-07-30 ENCOUNTER — CLINICAL SUPPORT (OUTPATIENT)
Dept: FAMILY MEDICINE CLINIC | Age: 40
End: 2025-07-30
Payer: COMMERCIAL

## 2025-07-30 DIAGNOSIS — Z23 IMMUNIZATION DUE: Primary | ICD-10-CM

## 2025-07-30 PROCEDURE — 99999 PR OFFICE/OUTPT VISIT,PROCEDURE ONLY: CPT | Performed by: FAMILY MEDICINE

## 2025-07-30 PROCEDURE — 90471 IMMUNIZATION ADMIN: CPT | Performed by: FAMILY MEDICINE

## 2025-07-30 PROCEDURE — 90739 HEPB VACC 2/4 DOSE ADULT IM: CPT | Performed by: FAMILY MEDICINE

## 2025-07-30 NOTE — PROGRESS NOTES
After obtaining consent, and per orders of Dr. Cano, injection of Hepsalv 1st dose given in Left deltoid by Reta Traylor MA. Patient instructed to remain in clinic for 20 minutes afterwards, and to report any adverse reaction to me immediately.  Patient needs to come back in 4 weeks for the second dose.

## 2025-08-06 ENCOUNTER — PATIENT MESSAGE (OUTPATIENT)
Dept: FAMILY MEDICINE CLINIC | Age: 40
End: 2025-08-06